# Patient Record
Sex: MALE | Race: WHITE | Employment: OTHER | URBAN - METROPOLITAN AREA
[De-identification: names, ages, dates, MRNs, and addresses within clinical notes are randomized per-mention and may not be internally consistent; named-entity substitution may affect disease eponyms.]

---

## 2020-09-02 NOTE — PROGRESS NOTES
HISTORY OF PRESENT ILLNESS  Demetra Mane is a 76 y.o. male. HPI   Pt here to establish care  Former PCP-Dr BRENDA Adan in [facility]   retired , lives alone  Hx severe MR s/p MV repair in 2014and had vein graft to RCA and left atrial appendage exclusion-Dr Claude Alexander  Had V nicholas on NST and hx Aflutter CV  Had sustained Vtach afterwards  ICD implant with ef 35%  Low normal EF in 2018 by Violeta Coulter now at [address]    Hx gerd, genuital HSV, mild HLD    Active physically , only mild HENDRICKSON, no CP  Walks for exercise    Stable weight per pt  Sees Dr Beto Chirinos for BPH and elevated PSA      Sees DERM MD for hx skin cancer q 6 months--non-melanoma    Had screening colonoscopy 2013-mild sig tics and hemorrhoids-Dr Makayla Diallo    Has had right sciatica x 4-5 months--improving with PT. Had Xray -DDD    Has been labs q 6 months    Has had screening colonoscopy 2013    Patient Active Problem List    Diagnosis Date Noted    Pure hypercholesterolemia 09/03/2020    Benign prostatic hyperplasia without lower urinary tract symptoms 09/03/2020     Current Outpatient Medications   Medication Sig Dispense Refill    atorvastatin (LIPITOR) 20 mg tablet TK 1 T PO ONCE D      digoxin (LANOXIN) 0.125 mg tablet TK 1 T PO QD      carvediloL (COREG) 6.25 mg tablet TK 1 T PO BID WF      amiodarone (CORDARONE) 200 mg tablet TK 1 T PO ONCE D      finasteride (PROSCAR) 5 mg tablet TK 1 T PO QD      lisinopriL (PRINIVIL, ZESTRIL) 5 mg tablet TK 1 T PO QD      triamcinolone acetonide (KENALOG) 0.1 % topical cream APPLY AA BID      amoxicillin (AMOXIL) 500 mg capsule Take 500 mg by mouth daily as needed. Takes prior to dental procedures      aspirin 81 mg tablet Take 81 mg by mouth daily.  GLUCOSAMINE HCL/CHONDR PERRY A NA (GLUCOSAMINE-CHONDROITIN) 750-600 mg Tab Take  by mouth daily.  multivitamin (ONE A DAY) tablet Take 1 Tab by mouth daily.       nicotinic acid (NIACIN) 500 mg tablet Take 500 mg by mouth daily.  grape seed extract 100 mg cap Take  by mouth daily.  OTHER Prostrate care 3 pill q day      OTHER Cholesterol control from Dr Lin Lazo catalog       Allergies   Allergen Reactions    Bacitracin Rash    Penicillin G Swelling     Past Medical History:   Diagnosis Date    Asthma     GERD (gastroesophageal reflux disease)     Kidney stones     Mitral valve prolapse      Past Surgical History:   Procedure Laterality Date    ABDOMEN SURGERY PROC UNLISTED  1982 1984    hernia repair    HX GI      appencectomy  with kinked intestine    HX MITRAL VALVE REPLACEMENT  2014    s/p Mitral valve repair    HX ORTHOPAEDIC  2001    achilies tendon rupture    HX UROLOGICAL      kidney stone removed    OR COLONOSCOPY FLX DX W/COLLJ SPEC WHEN PFRMD  6/11/2013          Family History   Problem Relation Age of Onset    Hypertension Father 46     Social History     Tobacco Use    Smoking status: Never Smoker    Smokeless tobacco: Never Used   Substance Use Topics    Alcohol use: Yes     Frequency: Monthly or less     Comment: occassional-moderate      No results found for: WBC, WBCT, WBCPOC, HGB, HGBPOC, HCT, HCTPOC, PLT, PLTPOC, MCV, MCVPOC, HGBEXT, HCTEXT, PLTEXT  No results found for: HBA1C, SJY5POPQ, HGBE8, GLU, GESTF, GLUCPOC, MCACR, MCA1, MCA2, MCA3, MCAU, LDL, LDLC, DLDLP, SCOT, CREAPOC, ACREA, CREA, REFC3, REFC4, XXO3ZULE   No results found for: CHOL, CHOLPOCT, HDL, LDL, LDLC, LDLCPOC, LDLCEXT, TRIGL, TGLPOCT, CHHD, CHHDX  No results found for: GFRNA, GFRNAPOC, GFRAA, GFRAAPOC, CREA, CREAPOC, BUN, IBUN, BUNPOC, NA, NAPOC, K, KPOCT, CL, CLPOC, CO2, CO2POC, MG, PHOS, ALBEU, PTH, PTHILT, EPO     Review of Systems   Constitutional: Negative for chills, fever, malaise/fatigue and weight loss. Eyes: Negative for blurred vision and double vision. Respiratory: Negative for cough and shortness of breath. Cardiovascular: Negative for chest pain and palpitations. Gastrointestinal: Negative for abdominal pain, blood in stool, constipation, diarrhea, melena, nausea and vomiting. Genitourinary: Negative for dysuria, frequency, hematuria and urgency. Musculoskeletal: Negative for back pain, falls, joint pain and myalgias. Neurological: Negative for dizziness, tremors and headaches. Physical Exam  Vitals signs and nursing note reviewed. Constitutional:       General: He is not in acute distress. Appearance: Normal appearance. He is well-developed. Comments: Appears stated age   HENT:      Head: Normocephalic. Cardiovascular:      Rate and Rhythm: Normal rate and regular rhythm. Heart sounds: Normal heart sounds. Pulmonary:      Effort: Pulmonary effort is normal.      Breath sounds: Normal breath sounds. Chest:       Abdominal:      Palpations: Abdomen is soft. Musculoskeletal:      Comments: RLE ankle brace   Neurological:      Mental Status: He is alert. ASSESSMENT and PLAN  Diagnoses and all orders for this visit:    1. Coronary artery disease involving native coronary artery of native heart without angina pectoris  -     CBC W/O DIFF  -     METABOLIC PANEL, COMPREHENSIVE  -     LIPID PANEL   No cp or angina  2. Benign prostatic hyperplasia without lower urinary tract symptoms   F/u ROBBIN DILLARD  3. Pure hypercholesterolemia  -     METABOLIC PANEL, COMPREHENSIVE  -     LIPID PANEL  -     TSH 3RD GENERATION   On statin-goal LDL <100  4. CHF   F/u Dr Tommy Nunez   Asymptomatic    5. Hx Vtach   S/p ICD   On amiodarone-f/u tsh lft    6. Preventive   Recommended flu shot   Will review health maintenance in records  Follow-up and Dispositions    · Return in about 6 months (around 3/3/2021) for medicare wellness.

## 2020-09-03 ENCOUNTER — OFFICE VISIT (OUTPATIENT)
Dept: INTERNAL MEDICINE CLINIC | Age: 75
End: 2020-09-03
Payer: MEDICARE

## 2020-09-03 VITALS
SYSTOLIC BLOOD PRESSURE: 137 MMHG | OXYGEN SATURATION: 98 % | HEIGHT: 68 IN | BODY MASS INDEX: 21.28 KG/M2 | DIASTOLIC BLOOD PRESSURE: 75 MMHG | RESPIRATION RATE: 14 BRPM | TEMPERATURE: 98.5 F | WEIGHT: 140.4 LBS | HEART RATE: 86 BPM

## 2020-09-03 DIAGNOSIS — E78.00 PURE HYPERCHOLESTEROLEMIA: ICD-10-CM

## 2020-09-03 DIAGNOSIS — N40.0 BENIGN PROSTATIC HYPERPLASIA WITHOUT LOWER URINARY TRACT SYMPTOMS: ICD-10-CM

## 2020-09-03 DIAGNOSIS — I25.10 CORONARY ARTERY DISEASE INVOLVING NATIVE CORONARY ARTERY OF NATIVE HEART WITHOUT ANGINA PECTORIS: Primary | ICD-10-CM

## 2020-09-03 PROCEDURE — G0463 HOSPITAL OUTPT CLINIC VISIT: HCPCS | Performed by: INTERNAL MEDICINE

## 2020-09-03 PROCEDURE — 99203 OFFICE O/P NEW LOW 30 MIN: CPT | Performed by: INTERNAL MEDICINE

## 2020-09-03 RX ORDER — FINASTERIDE 5 MG/1
TABLET, FILM COATED ORAL
COMMUNITY
Start: 2020-06-18

## 2020-09-03 RX ORDER — AMOXICILLIN 500 MG/1
500 CAPSULE ORAL
COMMUNITY

## 2020-09-03 RX ORDER — AMIODARONE HYDROCHLORIDE 200 MG/1
TABLET ORAL
COMMUNITY
Start: 2020-08-18

## 2020-09-03 RX ORDER — LISINOPRIL 5 MG/1
TABLET ORAL
COMMUNITY
Start: 2020-07-28 | End: 2021-10-06

## 2020-09-03 RX ORDER — TRIAMCINOLONE ACETONIDE 1 MG/G
CREAM TOPICAL
COMMUNITY
Start: 2020-06-15 | End: 2021-03-25

## 2020-09-03 RX ORDER — CARVEDILOL 6.25 MG/1
TABLET ORAL
COMMUNITY
Start: 2020-06-22

## 2020-09-03 RX ORDER — ATORVASTATIN CALCIUM 20 MG/1
TABLET, FILM COATED ORAL
COMMUNITY
Start: 2020-06-18 | End: 2021-03-25 | Stop reason: SDUPTHER

## 2020-09-03 RX ORDER — DIGOXIN 125 MCG
TABLET ORAL
COMMUNITY
Start: 2020-07-28 | End: 2021-10-06

## 2020-09-03 NOTE — PROGRESS NOTES
Reviewed record in preparation for visit and have obtained necessary documentation. Identified pt with two pt identifiers(name and ). Chief Complaint   Patient presents with   [facility] Maintenance Due   Topic Date Due    Hepatitis C Screening  1945    DTaP/Tdap/Td series (1 - Tdap) 1966    Lipid Screen  1985    Shingrix Vaccine Age 50> (1 of 2) 1995    GLAUCOMA SCREENING Q2Y  2010    Pneumococcal 65+ years (1 of 1 - PPSV23) 2010    Medicare Yearly Exam  2020    Flu Vaccine (1) 2020       Mr. Pablo Jama has a reminder for a \"due or due soon\" health maintenance. I have asked that he discuss health maintenance topic(s) due with His  primary care provider. Coordination of Care Questionnaire:  :     1) Have you been to an emergency room, urgent care clinic since your last visit? no   Hospitalized since your last visit? no             2) Have you seen or consulted any other health care providers outside of [address] [place] [address] since your last visit? no  (Include any pap smears or colon screenings in this section.)    3) Do you have an Advance Directive on file? No, Patient has copy at home and to bring in next appointment. 4) Are you interested in receiving information on Advance Directives? NO    Patient is accompanied by self I have received verbal consent from [address] to discuss any/all medical information while they are present in the room.

## 2020-09-04 LAB
ALBUMIN SERPL-MCNC: 3.9 G/DL (ref 3.7–4.7)
ALBUMIN/GLOB SERPL: 1.6 {RATIO} (ref 1.2–2.2)
ALP SERPL-CCNC: 46 IU/L (ref 39–117)
ALT SERPL-CCNC: 25 IU/L (ref 0–44)
AST SERPL-CCNC: 27 IU/L (ref 0–40)
BILIRUB SERPL-MCNC: 0.9 MG/DL (ref 0–1.2)
BUN SERPL-MCNC: 14 MG/DL (ref 8–27)
BUN/CREAT SERPL: 11 (ref 10–24)
CALCIUM SERPL-MCNC: 8.9 MG/DL (ref 8.6–10.2)
CHLORIDE SERPL-SCNC: 101 MMOL/L (ref 96–106)
CHOLEST SERPL-MCNC: 157 MG/DL (ref 100–199)
CO2 SERPL-SCNC: 26 MMOL/L (ref 20–29)
CREAT SERPL-MCNC: 1.24 MG/DL (ref 0.76–1.27)
ERYTHROCYTE [DISTWIDTH] IN BLOOD BY AUTOMATED COUNT: 12.6 % (ref 11.6–15.4)
GLOBULIN SER CALC-MCNC: 2.5 G/DL (ref 1.5–4.5)
GLUCOSE SERPL-MCNC: 87 MG/DL (ref 65–99)
HCT VFR BLD AUTO: 42.9 % (ref 37.5–51)
HDLC SERPL-MCNC: 66 MG/DL
HGB BLD-MCNC: 14.3 G/DL (ref 13–17.7)
LDLC SERPL CALC-MCNC: 78 MG/DL (ref 0–99)
MCH RBC QN AUTO: 30.7 PG (ref 26.6–33)
MCHC RBC AUTO-ENTMCNC: 33.3 G/DL (ref 31.5–35.7)
MCV RBC AUTO: 92 FL (ref 79–97)
PLATELET # BLD AUTO: 184 X10E3/UL (ref 150–450)
POTASSIUM SERPL-SCNC: 4.6 MMOL/L (ref 3.5–5.2)
PROT SERPL-MCNC: 6.4 G/DL (ref 6–8.5)
RBC # BLD AUTO: 4.66 X10E6/UL (ref 4.14–5.8)
SODIUM SERPL-SCNC: 138 MMOL/L (ref 134–144)
TRIGL SERPL-MCNC: 69 MG/DL (ref 0–149)
TSH SERPL DL<=0.005 MIU/L-ACNC: 1.82 UIU/ML (ref 0.45–4.5)
VLDLC SERPL CALC-MCNC: 13 MG/DL (ref 5–40)
WBC # BLD AUTO: 4.4 X10E3/UL (ref 3.4–10.8)

## 2020-09-09 ENCOUNTER — TELEPHONE (OUTPATIENT)
Dept: INTERNAL MEDICINE CLINIC | Age: 75
End: 2020-09-09

## 2020-09-09 NOTE — TELEPHONE ENCOUNTER
#[phone]   Pt returning your call from 9-4-20 for lab results.     Pt states you may call him on 9-10-20

## 2020-09-10 NOTE — TELEPHONE ENCOUNTER
Called, spoke to pt. Two identifiers confirmed. Notified pt of lab results/recommendations per Dr. Tianna Kim. Pt verbalized understanding of information discussed w/ no further questions at this time.

## 2021-02-18 NOTE — PERIOP NOTES
Patient denied any COVID-19 symptoms or possible exposure that would require a pre-procedural COVID-19 test for the Cath Lab procedure scheduled on 2/23/21.

## 2021-02-23 ENCOUNTER — HOSPITAL ENCOUNTER (OUTPATIENT)
Age: 76
Discharge: HOME | End: 2021-02-24
Attending: INTERNAL MEDICINE | Admitting: INTERNAL MEDICINE
Payer: MEDICARE

## 2021-02-23 DIAGNOSIS — R07.9 CHEST PAIN, UNSPECIFIED TYPE: ICD-10-CM

## 2021-02-23 LAB
ACT BLD: 224 SECS (ref 79–138)
ACT BLD: 279 SECS (ref 79–138)
ATRIAL RATE: 61 BPM
CALCULATED P AXIS, ECG09: 83 DEGREES
CALCULATED R AXIS, ECG10: 46 DEGREES
CALCULATED T AXIS, ECG11: -51 DEGREES
DIAGNOSIS, 93000: NORMAL
P-R INTERVAL, ECG05: 278 MS
Q-T INTERVAL, ECG07: 452 MS
QRS DURATION, ECG06: 96 MS
QTC CALCULATION (BEZET), ECG08: 455 MS
VENTRICULAR RATE, ECG03: 61 BPM

## 2021-02-23 PROCEDURE — C1887 CATHETER, GUIDING: HCPCS | Performed by: INTERNAL MEDICINE

## 2021-02-23 PROCEDURE — 74011250636 HC RX REV CODE- 250/636: Performed by: INTERNAL MEDICINE

## 2021-02-23 PROCEDURE — 77030008543 HC TBNG MON PRSS MRTM -A: Performed by: INTERNAL MEDICINE

## 2021-02-23 PROCEDURE — 77030028837 HC SYR ANGI PWR INJ COEU -A: Performed by: INTERNAL MEDICINE

## 2021-02-23 PROCEDURE — 85347 COAGULATION TIME ACTIVATED: CPT

## 2021-02-23 PROCEDURE — 77030010221 HC SPLNT WR POS TELE -B: Performed by: INTERNAL MEDICINE

## 2021-02-23 PROCEDURE — C1725 CATH, TRANSLUMIN NON-LASER: HCPCS | Performed by: INTERNAL MEDICINE

## 2021-02-23 PROCEDURE — 77030015766: Performed by: INTERNAL MEDICINE

## 2021-02-23 PROCEDURE — 74011250637 HC RX REV CODE- 250/637

## 2021-02-23 PROCEDURE — C1894 INTRO/SHEATH, NON-LASER: HCPCS | Performed by: INTERNAL MEDICINE

## 2021-02-23 PROCEDURE — 2709999900 HC NON-CHARGEABLE SUPPLY

## 2021-02-23 PROCEDURE — 77030019569 HC BND COMPR RAD TERU -B: Performed by: INTERNAL MEDICINE

## 2021-02-23 PROCEDURE — 92928 PRQ TCAT PLMT NTRAC ST 1 LES: CPT | Performed by: INTERNAL MEDICINE

## 2021-02-23 PROCEDURE — 93005 ELECTROCARDIOGRAM TRACING: CPT

## 2021-02-23 PROCEDURE — C1874 STENT, COATED/COV W/DEL SYS: HCPCS | Performed by: INTERNAL MEDICINE

## 2021-02-23 PROCEDURE — 74011000636 HC RX REV CODE- 636: Performed by: INTERNAL MEDICINE

## 2021-02-23 PROCEDURE — 74011250637 HC RX REV CODE- 250/637: Performed by: INTERNAL MEDICINE

## 2021-02-23 PROCEDURE — 99153 MOD SED SAME PHYS/QHP EA: CPT | Performed by: INTERNAL MEDICINE

## 2021-02-23 PROCEDURE — 77030019698 HC SYR ANGI MDLON MRTM -A: Performed by: INTERNAL MEDICINE

## 2021-02-23 PROCEDURE — 74011000250 HC RX REV CODE- 250: Performed by: INTERNAL MEDICINE

## 2021-02-23 PROCEDURE — 99152 MOD SED SAME PHYS/QHP 5/>YRS: CPT | Performed by: INTERNAL MEDICINE

## 2021-02-23 PROCEDURE — 93571 IV DOP VEL&/PRESS C FLO 1ST: CPT | Performed by: INTERNAL MEDICINE

## 2021-02-23 PROCEDURE — 77030019697 HC SYR ANGI INFL MRTM -B: Performed by: INTERNAL MEDICINE

## 2021-02-23 PROCEDURE — 93458 L HRT ARTERY/VENTRICLE ANGIO: CPT | Performed by: INTERNAL MEDICINE

## 2021-02-23 PROCEDURE — C1769 GUIDE WIRE: HCPCS | Performed by: INTERNAL MEDICINE

## 2021-02-23 DEVICE — STENT RONYX27518UX RESOLUTE ONYX 2.75X18
Type: IMPLANTABLE DEVICE | Status: FUNCTIONAL
Brand: RESOLUTE ONYX™

## 2021-02-23 RX ORDER — NIACIN 500 MG/1
500 TABLET, EXTENDED RELEASE ORAL DAILY
Status: DISCONTINUED | OUTPATIENT
Start: 2021-02-24 | End: 2021-02-24 | Stop reason: HOSPADM

## 2021-02-23 RX ORDER — SODIUM CHLORIDE 0.9 % (FLUSH) 0.9 %
5-40 SYRINGE (ML) INJECTION AS NEEDED
Status: DISCONTINUED | OUTPATIENT
Start: 2021-02-23 | End: 2021-02-24 | Stop reason: HOSPADM

## 2021-02-23 RX ORDER — VERAPAMIL HYDROCHLORIDE 2.5 MG/ML
INJECTION, SOLUTION INTRAVENOUS AS NEEDED
Status: DISCONTINUED | OUTPATIENT
Start: 2021-02-23 | End: 2021-02-23 | Stop reason: HOSPADM

## 2021-02-23 RX ORDER — AMIODARONE HYDROCHLORIDE 200 MG/1
200 TABLET ORAL DAILY
Status: DISCONTINUED | OUTPATIENT
Start: 2021-02-24 | End: 2021-02-24 | Stop reason: HOSPADM

## 2021-02-23 RX ORDER — ACETAMINOPHEN 650 MG/1
650 SUPPOSITORY RECTAL
Status: DISCONTINUED | OUTPATIENT
Start: 2021-02-23 | End: 2021-02-24 | Stop reason: HOSPADM

## 2021-02-23 RX ORDER — CLOPIDOGREL BISULFATE 75 MG/1
TABLET ORAL AS NEEDED
Status: DISCONTINUED | OUTPATIENT
Start: 2021-02-23 | End: 2021-02-23 | Stop reason: HOSPADM

## 2021-02-23 RX ORDER — ATORVASTATIN CALCIUM 20 MG/1
20 TABLET, FILM COATED ORAL
Status: DISCONTINUED | OUTPATIENT
Start: 2021-02-23 | End: 2021-02-24 | Stop reason: HOSPADM

## 2021-02-23 RX ORDER — SODIUM CHLORIDE 0.9 % (FLUSH) 0.9 %
5-40 SYRINGE (ML) INJECTION EVERY 8 HOURS
Status: DISCONTINUED | OUTPATIENT
Start: 2021-02-23 | End: 2021-02-24 | Stop reason: HOSPADM

## 2021-02-23 RX ORDER — GUAIFENESIN 100 MG/5ML
81 LIQUID (ML) ORAL DAILY
Status: DISCONTINUED | OUTPATIENT
Start: 2021-02-24 | End: 2021-02-24 | Stop reason: HOSPADM

## 2021-02-23 RX ORDER — DIGOXIN 125 MCG
0.12 TABLET ORAL DAILY
Status: DISCONTINUED | OUTPATIENT
Start: 2021-02-24 | End: 2021-02-24 | Stop reason: HOSPADM

## 2021-02-23 RX ORDER — GUAIFENESIN 100 MG/5ML
81 LIQUID (ML) ORAL
Status: ACTIVE | OUTPATIENT
Start: 2021-02-23 | End: 2021-02-23

## 2021-02-23 RX ORDER — THERA TABS 400 MCG
1 TAB ORAL DAILY
Status: DISCONTINUED | OUTPATIENT
Start: 2021-02-24 | End: 2021-02-24 | Stop reason: HOSPADM

## 2021-02-23 RX ORDER — HEPARIN SODIUM 200 [USP'U]/100ML
INJECTION, SOLUTION INTRAVENOUS
Status: COMPLETED | OUTPATIENT
Start: 2021-02-23 | End: 2021-02-23

## 2021-02-23 RX ORDER — FENTANYL CITRATE 50 UG/ML
INJECTION, SOLUTION INTRAMUSCULAR; INTRAVENOUS AS NEEDED
Status: DISCONTINUED | OUTPATIENT
Start: 2021-02-23 | End: 2021-02-23 | Stop reason: HOSPADM

## 2021-02-23 RX ORDER — LIDOCAINE HYDROCHLORIDE 10 MG/ML
INJECTION, SOLUTION EPIDURAL; INFILTRATION; INTRACAUDAL; PERINEURAL AS NEEDED
Status: DISCONTINUED | OUTPATIENT
Start: 2021-02-23 | End: 2021-02-23 | Stop reason: HOSPADM

## 2021-02-23 RX ORDER — ACETAMINOPHEN 325 MG/1
650 TABLET ORAL
Status: DISCONTINUED | OUTPATIENT
Start: 2021-02-23 | End: 2021-02-24 | Stop reason: HOSPADM

## 2021-02-23 RX ORDER — LISINOPRIL 5 MG/1
5 TABLET ORAL DAILY
Status: DISCONTINUED | OUTPATIENT
Start: 2021-02-24 | End: 2021-02-24 | Stop reason: HOSPADM

## 2021-02-23 RX ORDER — GUAIFENESIN 100 MG/5ML
LIQUID (ML) ORAL
Status: COMPLETED
Start: 2021-02-23 | End: 2021-02-23

## 2021-02-23 RX ORDER — PROMETHAZINE HYDROCHLORIDE 25 MG/1
12.5 TABLET ORAL
Status: DISCONTINUED | OUTPATIENT
Start: 2021-02-23 | End: 2021-02-24 | Stop reason: HOSPADM

## 2021-02-23 RX ORDER — CLOPIDOGREL BISULFATE 75 MG/1
75 TABLET ORAL DAILY
Status: DISCONTINUED | OUTPATIENT
Start: 2021-02-24 | End: 2021-02-24 | Stop reason: HOSPADM

## 2021-02-23 RX ORDER — HEPARIN SODIUM 1000 [USP'U]/ML
INJECTION, SOLUTION INTRAVENOUS; SUBCUTANEOUS AS NEEDED
Status: DISCONTINUED | OUTPATIENT
Start: 2021-02-23 | End: 2021-02-23 | Stop reason: HOSPADM

## 2021-02-23 RX ORDER — POLYETHYLENE GLYCOL 3350 17 G/17G
17 POWDER, FOR SOLUTION ORAL DAILY PRN
Status: DISCONTINUED | OUTPATIENT
Start: 2021-02-23 | End: 2021-02-24 | Stop reason: HOSPADM

## 2021-02-23 RX ORDER — MIDAZOLAM HYDROCHLORIDE 1 MG/ML
INJECTION, SOLUTION INTRAMUSCULAR; INTRAVENOUS AS NEEDED
Status: DISCONTINUED | OUTPATIENT
Start: 2021-02-23 | End: 2021-02-23 | Stop reason: HOSPADM

## 2021-02-23 RX ORDER — CARVEDILOL 6.25 MG/1
6.25 TABLET ORAL 2 TIMES DAILY WITH MEALS
Status: DISCONTINUED | OUTPATIENT
Start: 2021-02-23 | End: 2021-02-24 | Stop reason: HOSPADM

## 2021-02-23 RX ORDER — FINASTERIDE 5 MG/1
5 TABLET, FILM COATED ORAL DAILY
Status: DISCONTINUED | OUTPATIENT
Start: 2021-02-24 | End: 2021-02-24 | Stop reason: HOSPADM

## 2021-02-23 RX ORDER — ONDANSETRON 2 MG/ML
4 INJECTION INTRAMUSCULAR; INTRAVENOUS
Status: DISCONTINUED | OUTPATIENT
Start: 2021-02-23 | End: 2021-02-24 | Stop reason: HOSPADM

## 2021-02-23 RX ADMIN — ATORVASTATIN CALCIUM 20 MG: 20 TABLET, FILM COATED ORAL at 21:45

## 2021-02-23 RX ADMIN — Medication 10 ML: at 22:00

## 2021-02-23 RX ADMIN — Medication 10 ML: at 17:33

## 2021-02-23 RX ADMIN — ASPIRIN 81 MG: 81 TABLET, CHEWABLE ORAL at 11:25

## 2021-02-23 NOTE — Clinical Note
TRANSFER - OUT REPORT:     Verbal report given to: St. vida RN. Report consisted of patient's Situation, Background, Assessment and   Recommendations(SBAR). Opportunity for questions and clarification was provided. Patient transported to: [facility].

## 2021-02-23 NOTE — Clinical Note
Lesion: Located in the Proximal LAD. Stent deployed. Single technique used. First inflation pressure = 14 janae; inflation time: 20 sec.

## 2021-02-23 NOTE — PROGRESS NOTES
Brief Procedure Note    Patient: Temi Lou MRN: [id number]  SSN: [id number]    YOB: 1945  Age: 68 y.o. Sex: male      Date of Procedure: 2/23/2021     Pre-procedure Diagnosis: Abn nuc    Post-procedure Diagnosis: 1v CAD, patent SVG-RCA, abn IFR of LAD, PTCA/PCI of the LAD w/ a ELOY    Procedure: LHC, cors, alexandria bypass angio, IFR/PCI of LAD    Performed By: Cirilo Lema III, DO     Anesthesia: Moderate Sedation    Estimated Blood Loss: Less than 10 mL      Specimens:  None    Findings: as above    Complications: None    Implants: 1 [place] ELOY    Recommendations: Continue medical therapy.     Signed By: Mai Sanders DO     February 23, 2021

## 2021-02-23 NOTE — Clinical Note
Lesion located in the Proximal LAD. Balloon inflated using single inflation technique. Lesion #1: Pressure = 14 janae; Duration = 15 sec.

## 2021-02-24 VITALS
SYSTOLIC BLOOD PRESSURE: 109 MMHG | TEMPERATURE: 97.6 F | BODY MASS INDEX: 20.4 KG/M2 | DIASTOLIC BLOOD PRESSURE: 63 MMHG | RESPIRATION RATE: 18 BRPM | OXYGEN SATURATION: 97 % | HEIGHT: 68 IN | HEART RATE: 60 BPM | WEIGHT: 134.6 LBS

## 2021-02-24 LAB
ALBUMIN SERPL-MCNC: 3 G/DL (ref 3.5–5)
ALBUMIN/GLOB SERPL: 1 {RATIO} (ref 1.1–2.2)
ALP SERPL-CCNC: 48 U/L (ref 45–117)
ALT SERPL-CCNC: 33 U/L (ref 12–78)
ANION GAP SERPL CALC-SCNC: 5 MMOL/L (ref 5–15)
AST SERPL-CCNC: 25 U/L (ref 15–37)
BASOPHILS # BLD: 0 K/UL (ref 0–0.1)
BASOPHILS NFR BLD: 1 % (ref 0–1)
BILIRUB SERPL-MCNC: 0.8 MG/DL (ref 0.2–1)
BUN SERPL-MCNC: 16 MG/DL (ref 6–20)
BUN/CREAT SERPL: 16 (ref 12–20)
CALCIUM SERPL-MCNC: 8.2 MG/DL (ref 8.5–10.1)
CHLORIDE SERPL-SCNC: 107 MMOL/L (ref 97–108)
CO2 SERPL-SCNC: 28 MMOL/L (ref 21–32)
CREAT SERPL-MCNC: 1.03 MG/DL (ref 0.7–1.3)
DIFFERENTIAL METHOD BLD: ABNORMAL
EOSINOPHIL # BLD: 0.2 K/UL (ref 0–0.4)
EOSINOPHIL NFR BLD: 4 % (ref 0–7)
ERYTHROCYTE [DISTWIDTH] IN BLOOD BY AUTOMATED COUNT: 12.9 % (ref 11.5–14.5)
GLOBULIN SER CALC-MCNC: 2.9 G/DL (ref 2–4)
GLUCOSE SERPL-MCNC: 69 MG/DL (ref 65–100)
HCT VFR BLD AUTO: 38.7 % (ref 36.6–50.3)
HGB BLD-MCNC: 12.8 G/DL (ref 12.1–17)
IMM GRANULOCYTES # BLD AUTO: 0 K/UL (ref 0–0.04)
IMM GRANULOCYTES NFR BLD AUTO: 0 % (ref 0–0.5)
LYMPHOCYTES # BLD: 0.8 K/UL (ref 0.8–3.5)
LYMPHOCYTES NFR BLD: 20 % (ref 12–49)
MCH RBC QN AUTO: 30.5 PG (ref 26–34)
MCHC RBC AUTO-ENTMCNC: 33.1 G/DL (ref 30–36.5)
MCV RBC AUTO: 92.4 FL (ref 80–99)
MONOCYTES # BLD: 0.6 K/UL (ref 0–1)
MONOCYTES NFR BLD: 16 % (ref 5–13)
NEUTS SEG # BLD: 2.3 K/UL (ref 1.8–8)
NEUTS SEG NFR BLD: 59 % (ref 32–75)
NRBC # BLD: 0 K/UL (ref 0–0.01)
NRBC BLD-RTO: 0 PER 100 WBC
PLATELET # BLD AUTO: 146 K/UL (ref 150–400)
PMV BLD AUTO: 11.4 FL (ref 8.9–12.9)
POTASSIUM SERPL-SCNC: 3.9 MMOL/L (ref 3.5–5.1)
PROT SERPL-MCNC: 5.9 G/DL (ref 6.4–8.2)
RBC # BLD AUTO: 4.19 M/UL (ref 4.1–5.7)
RBC MORPH BLD: ABNORMAL
SODIUM SERPL-SCNC: 140 MMOL/L (ref 136–145)
WBC # BLD AUTO: 3.9 K/UL (ref 4.1–11.1)

## 2021-02-24 PROCEDURE — 36415 COLL VENOUS BLD VENIPUNCTURE: CPT

## 2021-02-24 PROCEDURE — 85025 COMPLETE CBC W/AUTO DIFF WBC: CPT

## 2021-02-24 PROCEDURE — 74011250637 HC RX REV CODE- 250/637: Performed by: INTERNAL MEDICINE

## 2021-02-24 PROCEDURE — 80053 COMPREHEN METABOLIC PANEL: CPT

## 2021-02-24 RX ORDER — CLOPIDOGREL BISULFATE 75 MG/1
75 TABLET ORAL DAILY
Qty: 90 TAB | Refills: 3 | Status: SHIPPED | OUTPATIENT
Start: 2021-02-24

## 2021-02-24 RX ADMIN — LISINOPRIL 5 MG: 5 TABLET ORAL at 10:06

## 2021-02-24 RX ADMIN — ASPIRIN 81 MG: 81 TABLET, CHEWABLE ORAL at 10:06

## 2021-02-24 RX ADMIN — CLOPIDOGREL BISULFATE 75 MG: 75 TABLET ORAL at 10:07

## 2021-02-24 RX ADMIN — DIGOXIN 0.12 MG: 125 TABLET ORAL at 10:06

## 2021-02-24 RX ADMIN — Medication 10 ML: at 06:41

## 2021-02-24 RX ADMIN — AMIODARONE HYDROCHLORIDE 200 MG: 200 TABLET ORAL at 10:06

## 2021-02-24 RX ADMIN — CARVEDILOL 6.25 MG: 6.25 TABLET, FILM COATED ORAL at 10:06

## 2021-02-24 NOTE — PROGRESS NOTES
Discussed discharge instructions with patient and niece. All questions answered. Cath site CDI. VSS. Tolerating ambulation in room and hallway. Denies chest pain, SOB, or dizziness. Escorted out via w/c, discharged home with niece in a private vehicle.

## 2021-02-24 NOTE — PROGRESS NOTES
Pt. Ambulated to bathroom and in the alves without difficulty. Pt. Denied SOB, CP and dizziness. Right radial cath site dressing intact, no bleeding and hematoma.

## 2021-02-24 NOTE — DISCHARGE INSTR - DIET
[address] [facility], [address]   [phone] [facility] [address] [facility]    [email or web address] Patient Discharge Instructions [place] Jasvir / [id number] : 1945 Admitted 2021 Discharged: 2021 It is important that you take the medication exactly as they are prescribed. Keep your medication in the bottles provided by the pharmacist and keep a list of the medication names, dosages, and times to be taken in your wallet. Do not take other medications without consulting your doctor. BRING ALL OF YOUR MEDICINES TO YOUR OFFICE VISIT with Kimberly Steve DO or my nurse practitioner, Fatoumata Perea. Debra Desouza Follow-up with Kimberly Steve DO or my nurse practitioner, Fatoumata Preea in 2 weeks. Cardiac Catheterization  Discharge Instructions Transradial Catheterization Discharge Instructions (WRIST) Discharge instructions: Your radial artery in your wrist was used for your cardiac catheterization. This site may be slightly bruised and sore following your procedure. Expect mild tingling or the hand and tenderness at the puncture site for up to 3 days. Excess movement of the wrist used should be avoided for the next 24-48 hours. No lifting over 2 pounds (approximately a ½ gallon of milk) with this arm for 24 hours. Keep the site of the procedure covered with a bandage for 24 hours. You may shower the day after your procedure. Do not take a tub bath or submerge the puncture site in water for 48 hours. No heavy impact activity/lifting > 30 pounds for 1 week. If bleeding of the wrist occurs at home: If the site on your wrist where you had the catheterization procedure begins to bleed, do not panic. Place 1 or 2 fingers over the puncture site and hold pressure to stop the bleeding. You may be able to feel your pulse as you hold pressure. Lift your fingers after 5 minutes to see if the bleeding has stopped. Once the bleeding has stopped, gently wipe the wrist area clean and cover with a bandage.  
 
If the bleeding from your wrist does not stop after 15 minutes, or if there is a large amount of bleeding or spurting, call 911 immediately (do not drive yourself to the hospital).  
 
Other concerns:  
The site may be slightly bruised and sore following your procedure. Should any of the following occur, contact your physician immediately:  
Any cool or coldness of the arm, discoloration over a large area, ongoing numbness or any abnormal sensations , moderate to severe pain or swelling in the arm.   
Redness, soreness, swelling, chills or fever, or colored drainage at the procedure site within 3-7 days after your procedure.  
 
If you have any further questions or concerns regarding your procedure please call the Cardiac Cath Lab office at [phone]. During regular business hours ask to speak to Dr. Simental. During non-business hours the answering service will answer. Ask to speak to the physician on call for [facility].  
 
Transfemoral Catheterization Discharge Instructions (GROIN) 
 
Do not drive, operate any machinery, or sign any legal documents for 24 hours after your procedure.  You must have someone to drive you home. 
 
You may take a shower 24 hours after your cardiac catheterization.  Be sure to get the dressing wet and then remove it; gently wash the area with warm soapy water.  Pat dry and leave open to air.  To help prevent infections, be sure to keep the cath site clean and dry.  No lotions, creams, powders, ointments, etc. in the cath site for approximately 1 week. 
 
Do not take a tub bath, get in a hot tub or swimming pool for approximately 5 days or until the cath site is completely healed.   
 
No strenuous activity or heavy lifting over 10 lbs. for 7 days. 
 
 Drink plenty of fluids for 24-48 hours after your cath to flush the contrast dye from your kidneys. No alcoholic beverages for 24 hours. You may resume your previous diet (low fat, low cholesterol) after your cath. After your cath, some bruising or discomfort is common during the healing process. Tylenol, 1-2 tablets every 6 hours as needed, is recommended if you experience any discomfort. If you experience any signs or symptoms of infection such as fever, chills, or poorly healing incision, persistent tenderness or swelling in the groin, redness and/or warmth to the touch, numbness, significant tingling or pain at the groin site or affected extremity, rash, drainage from the cath site, or if the leg feels tight or swollen, call your physician right away. If bleeding at the cath site occurs, take a clean gauze pad and apply direct pressure to the groin just above the puncture site. Call 911 immediately, and continue to apply direct pressure until an ambulance gets to your location. You may return to work  2  days after your cardiac cath if no groin bleeding. Information obtained by : 
I understand that if any problems occur once I am at home I am to contact my physician. I understand and acknowledge receipt of the instructions indicated above. R.N.'s Signature                                                                  Date/Time Patient or Representative Signature                                                          Date/Time Ana Das III, [address] [facility] [address] [facility], [address] [phone] [address]    [email or web address]

## 2021-02-24 NOTE — PROGRESS NOTES
End of Shift Note    Bedside shift change report given to Anil Contreras RN (oncoming nurse) by David Jones (offgoing nurse). Report included the following information SBAR, Kardex, Procedure Summary, Intake/Output, MAR, Accordion, Recent Results, Med Rec Status, Cardiac Rhythm Paced, Alarm Parameters , Pre Procedure Checklist, Procedure Verification, Quality Measures and Dual Neuro Assessment    Shift worked:  [id number]     Shift summary and any significant changes:    No change   Concerns for physician to address:    Zone phone for oncoming shift:  [phone]     Activity:  Activity Level: Bed Rest  Number times ambulated in hallways past shift: 0  Number of times OOB to chair past shift: 1    Cardiac:   Cardiac Monitoring: Yes      Cardiac Rhythm: Paced    Access:   Current line(s): PIV     Genitourinary:   Urinary status: voiding    Respiratory:   O2 Device: Room air  Chronic home O2 use?: NO  Incentive spirometer at bedside: NO     GI:     Current diet:  DIET CARDIAC Regular  Passing flatus: YES  Tolerating current diet: YES       Pain Management:   Patient states pain is manageable on current regimen: YES    Skin:  Vasyl Score: 23  Interventions: increase time out of bed    Patient Safety:  Fall Score:  Total Score: 1  Interventions: bed/chair alarm, gripper socks, pt to call before getting OOB and stay with me (per policy)       Length of Stay:  Expected LOS: - - -  Actual LOS: 0      Rachelle Marie

## 2021-02-24 NOTE — PROGRESS NOTES
Bedside shift change report given to Jyoti Diaz RN (oncoming nurse) by Nikki Aranda RN (offgoing nurse). Report included the following information SBAR, Kardex, Procedure Summary, Intake/Output, MAR, Accordion, Recent Results, Med Rec Status, Cardiac Rhythm Paced, Alarm Parameters , Pre Procedure Checklist, Procedure Verification, Quality Measures and Dual Neuro Assessment.

## 2021-02-24 NOTE — CARDIO/PULMONARY
Cardiac Rehab Note: chart review/referral  
 
Cardiac cath 2/23/21 with stent Smoking history assessed. Patient is a non smoker. Smoking Cessation Program link has not been added to the AVS. EF not available at this time. CAD education folder, with heart heathy diet, warning signs, heart facts, catheterization brochure, and out patient cardiac rehab program provided to [address] [facility] on 2/24/21. Educated using teach back method. Reviewed CAD diagnosis definition and purpose of intervention. Discussed risk factors for CAD to include the following: family history, elevated BMI, hyperlipidemia, hypertension, diabetes, and stress. Discussed Heart Healthy/Low Sodium (less than 2000 mg) diet. Reviewed the importance of medication compliance, follow up appointments with cardiologist, signs and symptoms of angina, and what to report to physician after discharge. Emphasized the value of cardiac rehab. Discussed Cardiac Rehab Program format, benefits, and encouraged enrollment to assist with risk modification and management. Patient lives over an hour away, declines outpatient Cardiac Rehab enrollment at this time due to distance. Paul Pinto verbalized understanding with questions answered.   Flonnie Buerger, RN

## 2021-02-26 ENCOUNTER — TELEPHONE (OUTPATIENT)
Dept: INTERNAL MEDICINE CLINIC | Age: 76
End: 2021-02-26

## 2021-02-26 NOTE — TELEPHONE ENCOUNTER
Patient states he needs a call back to discuss if he needs a sooner appt than 3/25/21 due to patient just having a Stint put in. Please call to discuss & advise.  Thank ou

## 2021-03-02 NOTE — TELEPHONE ENCOUNTER
MD Avani Horvath LPN   Caller: Unspecified (4 days ago,  1:38 PM)             Scheduled appt on 3/25 should be ok

## 2021-03-02 NOTE — TELEPHONE ENCOUNTER
Per PSR BJ, pt called back and was notified of Dr. Qi Cooper response. Per PSR BJ, pt will keep 3/25/21 appt. Closing.

## 2021-03-11 DIAGNOSIS — Z86.79 HX OF VENTRICULAR TACHYCARDIA: ICD-10-CM

## 2021-03-11 DIAGNOSIS — I50.9 CHRONIC CONGESTIVE HEART FAILURE, UNSPECIFIED HEART FAILURE TYPE (HCC): Primary | ICD-10-CM

## 2021-03-11 DIAGNOSIS — E78.00 PURE HYPERCHOLESTEROLEMIA: ICD-10-CM

## 2021-03-11 DIAGNOSIS — I25.10 CORONARY ARTERY DISEASE INVOLVING NATIVE CORONARY ARTERY OF NATIVE HEART WITHOUT ANGINA PECTORIS: ICD-10-CM

## 2021-03-11 DIAGNOSIS — I50.22 CHRONIC SYSTOLIC CONGESTIVE HEART FAILURE (HCC): Primary | ICD-10-CM

## 2021-03-11 PROBLEM — Z95.2 S/P MVR (MITRAL VALVE REPLACEMENT): Status: ACTIVE | Noted: 2021-03-11

## 2021-03-17 LAB
CHOLEST SERPL-MCNC: 165 MG/DL (ref 100–199)
DIGOXIN SERPL-MCNC: 0.9 NG/ML (ref 0.5–0.9)
HDLC SERPL-MCNC: 67 MG/DL
LDLC SERPL CALC-MCNC: 82 MG/DL (ref 0–99)
TRIGL SERPL-MCNC: 88 MG/DL (ref 0–149)
TSH SERPL DL<=0.005 MIU/L-ACNC: 3.49 UIU/ML (ref 0.45–4.5)
VLDLC SERPL CALC-MCNC: 16 MG/DL (ref 5–40)

## 2021-03-25 ENCOUNTER — OFFICE VISIT (OUTPATIENT)
Dept: INTERNAL MEDICINE CLINIC | Age: 76
End: 2021-03-25
Payer: MEDICARE

## 2021-03-25 VITALS
OXYGEN SATURATION: 98 % | WEIGHT: 142 LBS | HEART RATE: 74 BPM | SYSTOLIC BLOOD PRESSURE: 94 MMHG | HEIGHT: 68 IN | DIASTOLIC BLOOD PRESSURE: 61 MMHG | BODY MASS INDEX: 21.52 KG/M2 | TEMPERATURE: 98.4 F | RESPIRATION RATE: 16 BRPM

## 2021-03-25 DIAGNOSIS — I25.10 CORONARY ARTERY DISEASE INVOLVING NATIVE CORONARY ARTERY OF NATIVE HEART WITHOUT ANGINA PECTORIS: Primary | ICD-10-CM

## 2021-03-25 DIAGNOSIS — I50.22 CHRONIC SYSTOLIC CONGESTIVE HEART FAILURE (HCC): ICD-10-CM

## 2021-03-25 DIAGNOSIS — R09.81 SINUS CONGESTION: ICD-10-CM

## 2021-03-25 DIAGNOSIS — E78.00 PURE HYPERCHOLESTEROLEMIA: ICD-10-CM

## 2021-03-25 DIAGNOSIS — Z00.00 MEDICARE ANNUAL WELLNESS VISIT, SUBSEQUENT: ICD-10-CM

## 2021-03-25 PROCEDURE — G8510 SCR DEP NEG, NO PLAN REQD: HCPCS | Performed by: INTERNAL MEDICINE

## 2021-03-25 PROCEDURE — G8427 DOCREV CUR MEDS BY ELIG CLIN: HCPCS | Performed by: INTERNAL MEDICINE

## 2021-03-25 PROCEDURE — 1101F PT FALLS ASSESS-DOCD LE1/YR: CPT | Performed by: INTERNAL MEDICINE

## 2021-03-25 PROCEDURE — G0463 HOSPITAL OUTPT CLINIC VISIT: HCPCS | Performed by: INTERNAL MEDICINE

## 2021-03-25 PROCEDURE — G8420 CALC BMI NORM PARAMETERS: HCPCS | Performed by: INTERNAL MEDICINE

## 2021-03-25 PROCEDURE — 99214 OFFICE O/P EST MOD 30 MIN: CPT | Performed by: INTERNAL MEDICINE

## 2021-03-25 PROCEDURE — G0439 PPPS, SUBSEQ VISIT: HCPCS | Performed by: INTERNAL MEDICINE

## 2021-03-25 PROCEDURE — G8536 NO DOC ELDER MAL SCRN: HCPCS | Performed by: INTERNAL MEDICINE

## 2021-03-25 RX ORDER — ATORVASTATIN CALCIUM 40 MG/1
TABLET, FILM COATED ORAL
Qty: 90 TAB | Refills: 3 | Status: SHIPPED | OUTPATIENT
Start: 2021-03-25 | End: 2022-03-29

## 2021-03-25 RX ORDER — LEVOTHYROXINE SODIUM 50 UG/1
TABLET ORAL
COMMUNITY
End: 2022-05-03 | Stop reason: DRUGHIGH

## 2021-03-25 NOTE — PROGRESS NOTES
HISTORY OF PRESENT ILLNESS  Lanie Robertson is a 68 y.o. male. HPI   Fu CAD CHF hx V-tach HLD BPH   Had recent labs LDL 80  Brought AMD today     Had cardiac stent placed LAD /DESlast month for abnl stress test, left arm pain, sob and fatigue-plavix added to aspirin  Feels much better overall  Sees urologist yearly now--in [facility]  Walking upstairs without difficulty or SOB    Sees OLGA DILLARD for skin checks and needs bx right cheek lesion  Has had chronic left maxillary sinus congestion and recent bleeding from left nares    Last OV  Pt here to establish care  Former PCP-Dr BRENDA Adan in [address] [facility]-records [place] and reviewd   retired , lives alone  Hx severe MR s/p MV repair in 2014and had vein graft to RCA and left atrial appendage exclusion-Dr Rachael Madera  Had V tach on NST and hx Aflutter CV  Had sustained Vtach afterwards  ICD implant with ef 35%  Low normal EF in 2018 by CTA  Sees Dr Norma Razo now at [address]     Hx gerd, genital HSV, mild HLD     Active physically , only mild HENDRICKSON, no CP  Walks for exercise     Stable weight per pt  Sees Dr William Talley for BPH and elevated PSA  Sees DERM MD for hx skin cancer q 6 months--non-melanoma     Had screening colonoscopy 2013-mild sig tics and hemorrhoids-Dr Rashaad York     Has had right sciatica x 4-5 months--improving with PT.  Had Xray -DDD     Has been labs q 6 months     Has had screening colonoscopy 2013         Patient Active Problem List    Diagnosis Date Noted    S/P MVR (mitral valve replacement) 03/11/2021    Chronic systolic congestive heart failure ([address] 03/11/2021    Hx of ventricular tachycardia 03/11/2021    Coronary artery disease involving native coronary artery of native heart without angina pectoris 03/11/2021    Pure hypercholesterolemia 09/03/2020    Benign prostatic hyperplasia without lower urinary tract symptoms 09/03/2020     Current Outpatient Medications   Medication Sig Dispense Refill    clopidogreL (PLAVIX) 75 mg tab Take 1 Tab by mouth daily. 90 Tab 3    atorvastatin (LIPITOR) 20 mg tablet TK 1 T PO ONCE D      digoxin (LANOXIN) 0.125 mg tablet TK 1 T PO QD      carvediloL (COREG) 6.25 mg tablet TK 1 T PO BID WF      amiodarone (CORDARONE) 200 mg tablet TK 1 T PO ONCE D      finasteride (PROSCAR) 5 mg tablet TK 1 T PO QD      lisinopriL (PRINIVIL, ZESTRIL) 5 mg tablet TK 1 T PO QD      triamcinolone acetonide (KENALOG) 0.1 % topical cream APPLY AA BID      amoxicillin (AMOXIL) 500 mg capsule Take 500 mg by mouth daily as needed. Takes prior to dental procedures      aspirin 81 mg tablet Take 81 mg by mouth daily.  GLUCOSAMINE HCL/CHONDR PERRY A NA (GLUCOSAMINE-CHONDROITIN) 750-600 mg Tab Take  by mouth daily.  multivitamin (ONE A DAY) tablet Take 1 Tab by mouth daily.  nicotinic acid (NIACIN) 500 mg tablet Take 500 mg by mouth daily.  grape seed extract 100 mg cap Take  by mouth daily.       OTHER Prostrate care 3 pill q day      OTHER Cholesterol control from Dr Holley Sandhoff catalog       Allergies   Allergen Reactions    Bacitracin Rash    Penicillin G Swelling      Lab Results   Component Value Date/Time    WBC 3.9 (L) 02/24/2021 05:30 AM    HGB 12.8 02/24/2021 05:30 AM    HCT 38.7 02/24/2021 05:30 AM    PLATELET 513 (L) 76/94/0652 05:30 AM    MCV 92.4 02/24/2021 05:30 AM     Lab Results   Component Value Date/Time    Glucose 69 02/24/2021 05:30 AM    LDL, calculated 82 03/16/2021 09:31 AM    Creatinine 1.03 02/24/2021 05:30 AM      Lab Results   Component Value Date/Time    Cholesterol, total 165 03/16/2021 09:31 AM    HDL Cholesterol 67 03/16/2021 09:31 AM    LDL, calculated 82 03/16/2021 09:31 AM    Triglyceride 88 03/16/2021 09:31 AM     Lab Results   Component Value Date/Time    GFR est non-AA >60 02/24/2021 05:30 AM    GFR est AA >60 02/24/2021 05:30 AM    Creatinine 1.03 02/24/2021 05:30 AM    BUN 16 02/24/2021 05:30 AM    Sodium 140 02/24/2021 05:30 AM    Potassium 3.9 02/24/2021 05:30 AM Chloride 107 02/24/2021 05:30 AM    CO2 28 02/24/2021 05:30 AM     No results found for: Dana CARRERO, [id number], [id number]  Lab Results   Component Value Date/Time    TSH 3.490 03/16/2021 09:31 AM      Lab Results   Component Value Date/Time    Glucose 69 02/24/2021 05:30 AM         ROS    Physical Exam  Vitals signs and nursing note reviewed. Constitutional:       General: He is not in acute distress. Appearance: He is well-developed. Comments: Appears stated age   HENT:      Head: Normocephalic. Cardiovascular:      Rate and Rhythm: Normal rate and regular rhythm. Heart sounds: Normal heart sounds. Pulmonary:      Effort: Pulmonary effort is normal.      Breath sounds: Normal breath sounds. Abdominal:      Palpations: Abdomen is soft. Neurological:      Mental Status: He is alert. ASSESSMENT and PLAN         This is the Subsequent Medicare Annual Wellness Exam, performed 12 months or more after the Initial AWV or the last Subsequent AWV    I have reviewed the patient's medical history in detail and updated the computerized patient record. Depression Risk Factor Screening:     3 most recent PHQ Screens 3/25/2021   Little interest or pleasure in doing things Not at all   Feeling down, depressed, irritable, or hopeless Not at all   Total Score PHQ 2 0       Alcohol Risk Screen    Do you average more than 1 drink per night or more than 7 drinks a week: No    In the past three months have you have had more than 4 drinks containing alcohol on one occasion: No        Functional Ability and Level of Safety:    Hearing: The patient needs further evaluation. Activities of Daily Living: The home contains: [employer]  Patient does total self care      Ambulation: with no difficulty     Fall Risk:  Fall Risk Assessment, last 12 mths 3/25/2021   Able to walk? Yes   Fall in past 12 months? 0   Do you feel unsteady?  0   Are you worried about falling 0 Abuse Screen:  Patient is not abused       Cognitive Screening    Has your family/caregiver stated any concerns about your memory: no     Cognitive Screening: Normal - serial 3    Assessment/Plan   Education and counseling provided:  Are appropriate based on today's review and evaluation  Prostate cancer screening tests (PSA, covered annually)  covid and shingrix recommended    1. CAD   S/p recent LAD ELOY   On aspirin/plavix   Fu Dr Phu Queen    2. HLD   LDL above goal   Increase lipitor 40 mg every day   Labs next OV    3. CHF   Compensated    4.  Sinus congestion   Referral to ENT if pt desires appt    Health Maintenance Due     Health Maintenance Due   Topic Date Due    Hepatitis C Screening  Never done    COVID-19 Vaccine (1) Never done    Shingrix Vaccine Age 50> (1 of 2) Never done    Pneumococcal 65+ years (1 of 1 - PPSV23) Never done    Medicare Yearly Exam  Never done       Patient Care Team   Patient Care Team:  Preston Booth MD as PCP - General (Internal Medicine)  Preston Booth MD as PCP - [address] [place] [address] Dr Wei Provider    History     Patient Active Problem List   Diagnosis Code    Pure hypercholesterolemia E78.00    Benign prostatic hyperplasia without lower urinary tract symptoms N40.0    S/P MVR (mitral valve replacement) Z95.2    Chronic systolic congestive heart failure ([address] I50.22    Hx of ventricular tachycardia Z86.79    Coronary artery disease involving native coronary artery of native heart without angina pectoris I25.10     Past Medical History:   Diagnosis Date    Asthma     GERD (gastroesophageal reflux disease)     Kidney stones     Mitral valve prolapse       Past Surgical History:   Procedure Laterality Date    HX GI      appencectomy  with kinked intestine    HX MITRAL VALVE REPLACEMENT  2014    s/p Mitral valve repair    HX ORTHOPAEDIC  2001    achilies tendon rupture    HX UROLOGICAL      kidney stone removed    MO ABDOMEN SURGERY PROC UNLISTED  1982 1984    hernia repair  NH COLONOSCOPY FLX DX W/COLLJ SPEC WHEN PFRMD  6/11/2013          Current Outpatient Medications   Medication Sig Dispense Refill    levothyroxine (SYNTHROID) 50 mcg tablet Take  by mouth Daily (before breakfast).  atorvastatin (LIPITOR) 40 mg tablet TK 1 T PO ONCE D 90 Tab 3    clopidogreL (PLAVIX) 75 mg tab Take 1 Tab by mouth daily. 90 Tab 3    digoxin (LANOXIN) 0.125 mg tablet TK 1 T PO QD      carvediloL (COREG) 6.25 mg tablet TK 1 T PO BID WF      amiodarone (CORDARONE) 200 mg tablet TK 1 T PO ONCE D      finasteride (PROSCAR) 5 mg tablet TK 1 T PO QD      lisinopriL (PRINIVIL, ZESTRIL) 5 mg tablet TK 1 T PO QD      amoxicillin (AMOXIL) 500 mg capsule Take 500 mg by mouth daily as needed. Takes prior to dental procedures      aspirin 81 mg tablet Take 81 mg by mouth daily.        Allergies   Allergen Reactions    Bacitracin Rash    Penicillin G Swelling       Family History   Problem Relation Age of Onset    Hypertension Father 46     Social History     Tobacco Use    Smoking status: Never Smoker    Smokeless tobacco: Never Used   Substance Use Topics    Alcohol use: Yes     Frequency: Monthly or less     Drinks per session: 1 or 2     Binge frequency: Never     Comment: occassional-moderate

## 2021-03-25 NOTE — LETTER
3/25/2021 Mr. Temi Lou [address] [address] Dear Temi Lou: Please find your most recent results below. Resulted Orders DIGOXIN Result Value Ref Range Digoxin level 0.9 0.5 - 0.9 ng/mL Narrative Performed at:  [address]  [id number] : Consuelo Sage MD, Phone:  [phone] TSH 3RD GENERATION Result Value Ref Range TSH 3.490 0.450 - 4.500 uIU/mL Narrative Performed at:  [address]  [id number] : Consuelo Sage MD, Phone:  [phone] LIPID PANEL Result Value Ref Range Cholesterol, total 165 100 - 199 mg/dL Triglyceride 88 0 - 149 mg/dL HDL Cholesterol 67 >39 mg/dL VLDL, calculated 16 5 - 40 mg/dL LDL, calculated 82 0 - 99 mg/dL Narrative Performed at:  [address]  [id number] : Consuelo Sage MD, Phone:  [phone] RECOMMENDATIONS: 
None. Keep up the good work! LDL cholesterol is above goal--- Please call me if you have any questions: [phone] Sincerely, 
 
 
Vickey Sacks, MD

## 2021-03-25 NOTE — PATIENT INSTRUCTIONS
Office Policies Phone calls/patient messages: Please allow up to 24 hours for someone in the office to contact you about your call or message. Be mindful your provider may be out of the office or your message may require further review. We encourage you to use [employer] for your messages as this is a faster, more efficient way to communicate with our office Medication Refills: 
         
Prescription medications require 48-72 business hours to process. We encourage you to use [employer] for your refills. For controlled medications: Please allow 72 business hours to process. Certain medications may require you to  a written prescription at our office. NO narcotic/controlled medications will be prescribed after 4pm Monday through Friday or on weekends Form/Paperwork Completion: 
         
Please note a $25 fee may incur for all paperwork for completed by our providers. We ask that you allow 7-10 business days. Pre-payment is due prior to picking up/faxing the completed form. You may also download your forms to [employer] to have your doctor print off. Medicare Wellness Visit, Male The best way to live healthy is to have a lifestyle where you eat a well-balanced diet, exercise regularly, limit alcohol use, and quit all forms of tobacco/nicotine, if applicable. Regular preventive services are another way to keep healthy. Preventive services (vaccines, screening tests, monitoring & exams) can help personalize your care plan, which helps you manage your own care. Screening tests can find health problems at the earliest stages, when they are easiest to treat. [facility] follows the current, evidence-based guidelines published by the [facility] and [facility] [facility] (USPSTF) when recommending preventive services for our patients.  Because we follow these guidelines, sometimes recommendations change over time as research supports it. (For example, a prostate screening blood test is no longer routinely recommended for men with no symptoms). Of course, you and your doctor may decide to screen more often for some diseases, based on your risk and co-morbidities (chronic disease you are already diagnosed with). Preventive services for you include: - Medicare offers their members a free annual wellness visit, which is time for you and your primary care provider to discuss and plan for your preventive service needs. Take advantage of this benefit every year! 
-All adults over age 72 should receive the recommended pneumonia vaccines. Current USPSTF guidelines recommend a series of two vaccines for the best pneumonia protection.  
-All adults should have a flu vaccine yearly and tetanus vaccine every 10 years. 
-All adults age 48 and older should receive the shingles vaccines (series of two vaccines). -All adults age 38-68 who are overweight should have a diabetes screening test once every three years.  
-Other screening tests & preventive services for persons with diabetes include: an eye exam to screen for diabetic retinopathy, a kidney function test, a foot exam, and stricter control over your cholesterol.  
-Cardiovascular screening for adults with routine risk involves an electrocardiogram (ECG) at intervals determined by the provider.  
-Colorectal cancer screening should be done for adults age 54-65 with no increased risk factors for colorectal cancer. There are a number of acceptable methods of screening for this type of cancer. Each test has its own benefits and drawbacks. Discuss with your provider what is most appropriate for you during your annual wellness visit.  The different tests include: colonoscopy (considered the best screening method), a fecal occult blood test, a fecal DNA test, and sigmoidoscopy. 
-All adults born between [facility] should be screened once for Hepatitis C. 
-An Abdominal Aortic Aneurysm (AAA) Screening is recommended for men age 73-68 who has ever smoked in their lifetime. Here is a list of your current Health Maintenance items (your personalized list of preventive services) with a due date: 
Health Maintenance Due Topic Date Due  
 Hepatitis C Test  Never done  COVID-19 Vaccine (1) Never done  Shingles Vaccine (1 of 2) Never done  Pneumococcal Vaccine (1 of 1 - PPSV23) Never done

## 2021-08-09 ENCOUNTER — TELEPHONE (OUTPATIENT)
Dept: INTERNAL MEDICINE CLINIC | Age: 76
End: 2021-08-09

## 2021-08-09 NOTE — TELEPHONE ENCOUNTER
Patient call to report that he was in the [facility] ED 8/7/21 for left arm pain, SOB and chest pain. The ED discharge patient and instructed patient to follow up with his PCP and Cardiologist. Patient is scheduled to see his Cardiologist 8/11/21. Patient is wanting to know if he need to follow up with Dr. Chacha Wilson. Patient was advised to keep his appointment with his Cardiologist base on his history of cardiac issues and I will get advisement form Dr. Chacha Wilson when he need to see him.

## 2021-08-10 NOTE — TELEPHONE ENCOUNTER
Spoke with patient he was advise to follow up with his cardiologist. Dr. Estiven Pappas suggested patient will only need appointment with him if patient or the cardiologist thinks it is needed. this point and time patient is planning to see the cardiologist.

## 2021-09-21 ENCOUNTER — TELEPHONE (OUTPATIENT)
Dept: INTERNAL MEDICINE CLINIC | Age: 76
End: 2021-09-21

## 2021-09-21 NOTE — TELEPHONE ENCOUNTER
Patient is requesting to have labs ordered prior to appointment. He needs them ordered for [facility] as he will go near his home in [address].       [phone] home

## 2021-09-22 DIAGNOSIS — I25.10 CORONARY ARTERY DISEASE INVOLVING NATIVE CORONARY ARTERY OF NATIVE HEART WITHOUT ANGINA PECTORIS: Primary | ICD-10-CM

## 2021-09-22 DIAGNOSIS — E78.00 PURE HYPERCHOLESTEROLEMIA: ICD-10-CM

## 2021-09-22 DIAGNOSIS — I50.20 SYSTOLIC CONGESTIVE HEART FAILURE, UNSPECIFIED HF CHRONICITY (HCC): ICD-10-CM

## 2021-10-01 LAB
ALBUMIN SERPL-MCNC: 4.1 G/DL (ref 3.7–4.7)
ALBUMIN/GLOB SERPL: 1.5 {RATIO} (ref 1.2–2.2)
ALP SERPL-CCNC: 56 IU/L (ref 44–121)
ALT SERPL-CCNC: 39 IU/L (ref 0–44)
AST SERPL-CCNC: 33 IU/L (ref 0–40)
BILIRUB SERPL-MCNC: 0.8 MG/DL (ref 0–1.2)
BUN SERPL-MCNC: 18 MG/DL (ref 8–27)
BUN/CREAT SERPL: 14 (ref 10–24)
CALCIUM SERPL-MCNC: 9.3 MG/DL (ref 8.6–10.2)
CHLORIDE SERPL-SCNC: 104 MMOL/L (ref 96–106)
CHOLEST SERPL-MCNC: 164 MG/DL (ref 100–199)
CO2 SERPL-SCNC: 28 MMOL/L (ref 20–29)
CREAT SERPL-MCNC: 1.26 MG/DL (ref 0.76–1.27)
ERYTHROCYTE [DISTWIDTH] IN BLOOD BY AUTOMATED COUNT: 12.4 % (ref 11.6–15.4)
GLOBULIN SER CALC-MCNC: 2.8 G/DL (ref 1.5–4.5)
GLUCOSE SERPL-MCNC: 91 MG/DL (ref 65–99)
HCT VFR BLD AUTO: 43.3 % (ref 37.5–51)
HDLC SERPL-MCNC: 76 MG/DL
HGB BLD-MCNC: 14.5 G/DL (ref 13–17.7)
LDLC SERPL CALC-MCNC: 76 MG/DL (ref 0–99)
MCH RBC QN AUTO: 31.3 PG (ref 26.6–33)
MCHC RBC AUTO-ENTMCNC: 33.5 G/DL (ref 31.5–35.7)
MCV RBC AUTO: 94 FL (ref 79–97)
PLATELET # BLD AUTO: 179 X10E3/UL (ref 150–450)
POTASSIUM SERPL-SCNC: 4.4 MMOL/L (ref 3.5–5.2)
PROT SERPL-MCNC: 6.9 G/DL (ref 6–8.5)
RBC # BLD AUTO: 4.63 X10E6/UL (ref 4.14–5.8)
SODIUM SERPL-SCNC: 142 MMOL/L (ref 134–144)
TRIGL SERPL-MCNC: 59 MG/DL (ref 0–149)
TSH SERPL DL<=0.005 MIU/L-ACNC: 3.94 UIU/ML (ref 0.45–4.5)
VLDLC SERPL CALC-MCNC: 12 MG/DL (ref 5–40)
WBC # BLD AUTO: 4.5 X10E3/UL (ref 3.4–10.8)

## 2021-10-06 ENCOUNTER — OFFICE VISIT (OUTPATIENT)
Dept: INTERNAL MEDICINE CLINIC | Age: 76
End: 2021-10-06
Payer: MEDICARE

## 2021-10-06 VITALS
RESPIRATION RATE: 18 BRPM | DIASTOLIC BLOOD PRESSURE: 70 MMHG | HEART RATE: 70 BPM | BODY MASS INDEX: 21.82 KG/M2 | WEIGHT: 144 LBS | OXYGEN SATURATION: 98 % | HEIGHT: 68 IN | TEMPERATURE: 98.4 F | SYSTOLIC BLOOD PRESSURE: 120 MMHG

## 2021-10-06 DIAGNOSIS — E03.9 ACQUIRED HYPOTHYROIDISM: ICD-10-CM

## 2021-10-06 DIAGNOSIS — E78.00 PURE HYPERCHOLESTEROLEMIA: ICD-10-CM

## 2021-10-06 DIAGNOSIS — I25.10 CORONARY ARTERY DISEASE INVOLVING NATIVE CORONARY ARTERY OF NATIVE HEART WITHOUT ANGINA PECTORIS: ICD-10-CM

## 2021-10-06 DIAGNOSIS — Z11.59 ENCOUNTER FOR HEPATITIS C SCREENING TEST FOR LOW RISK PATIENT: Primary | ICD-10-CM

## 2021-10-06 PROCEDURE — G0463 HOSPITAL OUTPT CLINIC VISIT: HCPCS | Performed by: INTERNAL MEDICINE

## 2021-10-06 PROCEDURE — G8427 DOCREV CUR MEDS BY ELIG CLIN: HCPCS | Performed by: INTERNAL MEDICINE

## 2021-10-06 PROCEDURE — G8420 CALC BMI NORM PARAMETERS: HCPCS | Performed by: INTERNAL MEDICINE

## 2021-10-06 PROCEDURE — 1101F PT FALLS ASSESS-DOCD LE1/YR: CPT | Performed by: INTERNAL MEDICINE

## 2021-10-06 PROCEDURE — 99214 OFFICE O/P EST MOD 30 MIN: CPT | Performed by: INTERNAL MEDICINE

## 2021-10-06 PROCEDURE — G8432 DEP SCR NOT DOC, RNG: HCPCS | Performed by: INTERNAL MEDICINE

## 2021-10-06 PROCEDURE — G8536 NO DOC ELDER MAL SCRN: HCPCS | Performed by: INTERNAL MEDICINE

## 2021-10-06 RX ORDER — EZETIMIBE 10 MG/1
10 TABLET ORAL DAILY
Qty: 90 TABLET | Refills: 3 | Status: SHIPPED | OUTPATIENT
Start: 2021-10-06 | End: 2022-10-03

## 2021-10-06 NOTE — PROGRESS NOTES
HISTORY OF PRESENT ILLNESS  Chey Sanders is a 68 y.o. male. HPI   Fu CAD CHF-ef normal now hx V-tach HLD BPH  hypothyroid  Had recent labs LDL 76  TSH 3.9    Had fu with Dr Vitor Cruz for ER fu --ICD device was checked  His digoxin  , lisinopril were stopped and coreg dose lowered to 1/2 tab bid--has felt better since bp is up  Last OV      Had recent labs LDL 80  Brought AMD today      Had cardiac stent placed LAD /DESlast month for abnl stress test, left arm pain, sob and fatigue-plavix added to aspirin  Feels much better overall  Sees urologist yearly now--in [place]  Walking upstairs without difficulty or SOB     Sees DERM MD for skin checks and needs bx right cheek lesion  Has had chronic left maxillary sinus congestion and recent bleeding from left nares    Patient Active Problem List    Diagnosis Date Noted    S/P MVR (mitral valve replacement) 03/11/2021    Chronic systolic congestive heart failure ([address] 03/11/2021    Hx of ventricular tachycardia 03/11/2021    Coronary artery disease involving native coronary artery of native heart without angina pectoris 03/11/2021    Pure hypercholesterolemia 09/03/2020    Benign prostatic hyperplasia without lower urinary tract symptoms 09/03/2020     Current Outpatient Medications   Medication Sig Dispense Refill    levothyroxine (SYNTHROID) 50 mcg tablet Take  by mouth Daily (before breakfast).  atorvastatin (LIPITOR) 40 mg tablet TK 1 T PO ONCE D 90 Tab 3    clopidogreL (PLAVIX) 75 mg tab Take 1 Tab by mouth daily. 90 Tab 3    carvediloL (COREG) 6.25 mg tablet 1/2 tab BID      amiodarone (CORDARONE) 200 mg tablet TK 1 T PO ONCE D      finasteride (PROSCAR) 5 mg tablet TK 1 T PO QD      aspirin 81 mg tablet Take 325 mg by mouth daily.  amoxicillin (AMOXIL) 500 mg capsule Take 500 mg by mouth daily as needed.  Takes prior to dental procedures       Allergies   Allergen Reactions    Bacitracin Rash    Penicillin G Swelling      Lab Results Component Value Date/Time    WBC 4.5 09/30/2021 09:48 AM    HGB 14.5 09/30/2021 09:48 AM    HCT 43.3 09/30/2021 09:48 AM    PLATELET 251 75/69/4308 09:48 AM    MCV 94 09/30/2021 09:48 AM     Lab Results   Component Value Date/Time    Glucose 91 09/30/2021 09:48 AM    LDL, calculated 76 09/30/2021 09:48 AM    Creatinine 1.26 09/30/2021 09:48 AM      Lab Results   Component Value Date/Time    Cholesterol, total 164 09/30/2021 09:48 AM    HDL Cholesterol 76 09/30/2021 09:48 AM    LDL, calculated 76 09/30/2021 09:48 AM    Triglyceride 59 09/30/2021 09:48 AM     Lab Results   Component Value Date/Time    GFR est non-AA 55 (L) 09/30/2021 09:48 AM    GFR est AA 64 09/30/2021 09:48 AM    Creatinine 1.26 09/30/2021 09:48 AM    BUN 18 09/30/2021 09:48 AM    Sodium 142 09/30/2021 09:48 AM    Potassium 4.4 09/30/2021 09:48 AM    Chloride 104 09/30/2021 09:48 AM    CO2 28 09/30/2021 09:48 AM        ROS    Physical Exam  Vitals and nursing note reviewed. Constitutional:       General: He is not in acute distress. Appearance: He is well-developed. Comments: Appears stated age   HENT:      Head: Normocephalic. Cardiovascular:      Rate and Rhythm: Normal rate and regular rhythm. Heart sounds: Normal heart sounds. Pulmonary:      Effort: Pulmonary effort is normal.      Breath sounds: Normal breath sounds. Abdominal:      Palpations: Abdomen is soft. Neurological:      Mental Status: He is alert. ASSESSMENT and PLAN  Diagnoses and all orders for this visit:    1. Encounter for hepatitis C screening test for low risk patient  -     HEPATITIS C AB; Future    2. Pure hypercholesterolemia  -     ezetimibe (ZETIA) 10 mg tablet; Take 1 Tablet by mouth daily.  -     LIPID PANEL; Future   Add zeita 10 gm every day to statin-goal LDL <70    3 CAD   Stable no cp or angina    4.  Hx V tach s/p ICD    5 hypothyroid   Euthyroid clinically and by labs    Follow-up and Dispositions    · Return in about 6 months (around 4/6/2022) for medicare welness.

## 2021-11-18 LAB
CHOLEST SERPL-MCNC: 137 MG/DL (ref 100–199)
HCV AB S/CO SERPL IA: <0.1 S/CO RATIO (ref 0–0.9)
HDLC SERPL-MCNC: 63 MG/DL
LDLC SERPL CALC-MCNC: 62 MG/DL (ref 0–99)
TRIGL SERPL-MCNC: 58 MG/DL (ref 0–149)
VLDLC SERPL CALC-MCNC: 12 MG/DL (ref 5–40)

## 2022-03-18 PROBLEM — Z95.2 S/P MVR (MITRAL VALVE REPLACEMENT): Status: ACTIVE | Noted: 2021-03-11

## 2022-03-18 PROBLEM — Z86.79 HX OF VENTRICULAR TACHYCARDIA: Status: ACTIVE | Noted: 2021-03-11

## 2022-03-18 PROBLEM — N40.0 BENIGN PROSTATIC HYPERPLASIA WITHOUT LOWER URINARY TRACT SYMPTOMS: Status: ACTIVE | Noted: 2020-09-03

## 2022-03-19 PROBLEM — I25.10 CORONARY ARTERY DISEASE INVOLVING NATIVE CORONARY ARTERY OF NATIVE HEART WITHOUT ANGINA PECTORIS: Status: ACTIVE | Noted: 2021-03-11

## 2022-03-20 PROBLEM — E78.00 PURE HYPERCHOLESTEROLEMIA: Status: ACTIVE | Noted: 2020-09-03

## 2022-03-20 PROBLEM — I50.22 CHRONIC SYSTOLIC CONGESTIVE HEART FAILURE (HCC): Status: ACTIVE | Noted: 2021-03-11

## 2022-03-29 RX ORDER — ATORVASTATIN CALCIUM 40 MG/1
TABLET, FILM COATED ORAL
Qty: 90 TABLET | Refills: 3 | Status: SHIPPED | OUTPATIENT
Start: 2022-03-29

## 2022-04-26 ENCOUNTER — TELEPHONE (OUTPATIENT)
Dept: INTERNAL MEDICINE CLINIC | Age: 77
End: 2022-04-26

## 2022-04-26 NOTE — TELEPHONE ENCOUNTER
Patient would like to get lab orders faxed to [facility] in [address] [place] [address] prior to his appointment next week.  Thanks

## 2022-04-27 DIAGNOSIS — E03.9 ACQUIRED HYPOTHYROIDISM: ICD-10-CM

## 2022-04-27 DIAGNOSIS — I50.9 CONGESTIVE HEART FAILURE, UNSPECIFIED HF CHRONICITY, UNSPECIFIED HEART FAILURE TYPE (HCC): ICD-10-CM

## 2022-04-27 DIAGNOSIS — E78.00 PURE HYPERCHOLESTEROLEMIA: Primary | ICD-10-CM

## 2022-04-29 LAB
ALBUMIN SERPL-MCNC: 4.1 G/DL (ref 3.7–4.7)
ALBUMIN/GLOB SERPL: 1.8 {RATIO} (ref 1.2–2.2)
ALP SERPL-CCNC: 56 IU/L (ref 44–121)
ALT SERPL-CCNC: 48 IU/L (ref 0–44)
AST SERPL-CCNC: 41 IU/L (ref 0–40)
BILIRUB SERPL-MCNC: 0.7 MG/DL (ref 0–1.2)
BUN SERPL-MCNC: 15 MG/DL (ref 8–27)
BUN/CREAT SERPL: 14 (ref 10–24)
CALCIUM SERPL-MCNC: 8.9 MG/DL (ref 8.6–10.2)
CHLORIDE SERPL-SCNC: 104 MMOL/L (ref 96–106)
CHOLEST SERPL-MCNC: 124 MG/DL (ref 100–199)
CO2 SERPL-SCNC: 25 MMOL/L (ref 20–29)
CREAT SERPL-MCNC: 1.09 MG/DL (ref 0.76–1.27)
EGFR: 70 ML/MIN/1.73
ERYTHROCYTE [DISTWIDTH] IN BLOOD BY AUTOMATED COUNT: 12.1 % (ref 11.6–15.4)
GLOBULIN SER CALC-MCNC: 2.3 G/DL (ref 1.5–4.5)
GLUCOSE SERPL-MCNC: 91 MG/DL (ref 65–99)
HCT VFR BLD AUTO: 44.8 % (ref 37.5–51)
HDLC SERPL-MCNC: 56 MG/DL
HGB BLD-MCNC: 14.6 G/DL (ref 13–17.7)
LDLC SERPL CALC-MCNC: 55 MG/DL (ref 0–99)
MCH RBC QN AUTO: 30.1 PG (ref 26.6–33)
MCHC RBC AUTO-ENTMCNC: 32.6 G/DL (ref 31.5–35.7)
MCV RBC AUTO: 92 FL (ref 79–97)
PLATELET # BLD AUTO: 237 X10E3/UL (ref 150–450)
POTASSIUM SERPL-SCNC: 4 MMOL/L (ref 3.5–5.2)
PROT SERPL-MCNC: 6.4 G/DL (ref 6–8.5)
RBC # BLD AUTO: 4.85 X10E6/UL (ref 4.14–5.8)
SODIUM SERPL-SCNC: 142 MMOL/L (ref 134–144)
TRIGL SERPL-MCNC: 58 MG/DL (ref 0–149)
TSH SERPL DL<=0.005 MIU/L-ACNC: 8.48 UIU/ML (ref 0.45–4.5)
VLDLC SERPL CALC-MCNC: 13 MG/DL (ref 5–40)
WBC # BLD AUTO: 4.7 X10E3/UL (ref 3.4–10.8)

## 2022-05-03 ENCOUNTER — OFFICE VISIT (OUTPATIENT)
Dept: INTERNAL MEDICINE CLINIC | Age: 77
End: 2022-05-03
Payer: MEDICARE

## 2022-05-03 VITALS
HEART RATE: 61 BPM | WEIGHT: 152 LBS | RESPIRATION RATE: 16 BRPM | HEIGHT: 68 IN | DIASTOLIC BLOOD PRESSURE: 60 MMHG | SYSTOLIC BLOOD PRESSURE: 110 MMHG | TEMPERATURE: 99.1 F | OXYGEN SATURATION: 99 % | BODY MASS INDEX: 23.04 KG/M2

## 2022-05-03 DIAGNOSIS — R79.89 LFT ELEVATION: ICD-10-CM

## 2022-05-03 DIAGNOSIS — I25.10 CORONARY ARTERY DISEASE INVOLVING NATIVE CORONARY ARTERY OF NATIVE HEART WITHOUT ANGINA PECTORIS: ICD-10-CM

## 2022-05-03 DIAGNOSIS — Z00.00 MEDICARE ANNUAL WELLNESS VISIT, SUBSEQUENT: ICD-10-CM

## 2022-05-03 DIAGNOSIS — I50.22 CHRONIC SYSTOLIC CONGESTIVE HEART FAILURE (HCC): ICD-10-CM

## 2022-05-03 DIAGNOSIS — E78.5 HYPERLIPIDEMIA, UNSPECIFIED HYPERLIPIDEMIA TYPE: ICD-10-CM

## 2022-05-03 DIAGNOSIS — E03.9 ACQUIRED HYPOTHYROIDISM: Primary | ICD-10-CM

## 2022-05-03 PROCEDURE — G0439 PPPS, SUBSEQ VISIT: HCPCS | Performed by: INTERNAL MEDICINE

## 2022-05-03 PROCEDURE — G8510 SCR DEP NEG, NO PLAN REQD: HCPCS | Performed by: INTERNAL MEDICINE

## 2022-05-03 PROCEDURE — 1101F PT FALLS ASSESS-DOCD LE1/YR: CPT | Performed by: INTERNAL MEDICINE

## 2022-05-03 PROCEDURE — G0463 HOSPITAL OUTPT CLINIC VISIT: HCPCS | Performed by: INTERNAL MEDICINE

## 2022-05-03 PROCEDURE — G8427 DOCREV CUR MEDS BY ELIG CLIN: HCPCS | Performed by: INTERNAL MEDICINE

## 2022-05-03 PROCEDURE — 99214 OFFICE O/P EST MOD 30 MIN: CPT | Performed by: INTERNAL MEDICINE

## 2022-05-03 PROCEDURE — G8536 NO DOC ELDER MAL SCRN: HCPCS | Performed by: INTERNAL MEDICINE

## 2022-05-03 PROCEDURE — G8420 CALC BMI NORM PARAMETERS: HCPCS | Performed by: INTERNAL MEDICINE

## 2022-05-03 RX ORDER — LEVOTHYROXINE SODIUM 75 UG/1
75 TABLET ORAL
Qty: 90 TABLET | Refills: 3 | Status: SHIPPED | OUTPATIENT
Start: 2022-05-03

## 2022-05-03 NOTE — PROGRESS NOTES
HISTORY OF PRESENT ILLNESS  Azeb Magallon is a 68 y.o. male. HPI   Azeb Magallon is a 68 y.o. male. Fu CAD CHF-ef normal now hx V-tach HLD BPH  hypothyroid and medicare wellness----  Saw Dr Amaya Owens in January  Had Fall in February this year--no injury. Went to ER hit left temple--head CT -negative in [place]  Had another ER visit for CP--dx indigestion  Sees [facility] MD in [place]  Recent labs  LDL 55   ast /alt 41/48    Last OV  Had recent labs LDL 76  TSH 3.9     Had fu with Dr Amaya Owens for ER fu --ICD device was checked  His digoxin  , lisinopril were stopped and coreg dose lowered to 1/2 tab bid--has felt better since bp is up    Patient Active Problem List    Diagnosis Date Noted    S/P MVR (mitral valve replacement) 03/11/2021    Chronic systolic congestive heart failure ([address] 03/11/2021    Hx of ventricular tachycardia 03/11/2021    Coronary artery disease involving native coronary artery of native heart without angina pectoris 03/11/2021    Pure hypercholesterolemia 09/03/2020    Benign prostatic hyperplasia without lower urinary tract symptoms 09/03/2020     Current Outpatient Medications   Medication Sig Dispense Refill    atorvastatin (LIPITOR) 40 mg tablet TAKE 1 TABLET BY MOUTH EVERY DAY 90 Tablet 3    ezetimibe (ZETIA) 10 mg tablet Take 1 Tablet by mouth daily. 90 Tablet 3    levothyroxine (SYNTHROID) 50 mcg tablet Take  by mouth Daily (before breakfast).  clopidogreL (PLAVIX) 75 mg tab Take 1 Tab by mouth daily. 90 Tab 3    carvediloL (COREG) 6.25 mg tablet 1/2 tab BID      amiodarone (CORDARONE) 200 mg tablet TK 1 T PO ONCE D      finasteride (PROSCAR) 5 mg tablet TK 1 T PO QD      amoxicillin (AMOXIL) 500 mg capsule Take 500 mg by mouth daily as needed. Takes prior to dental procedures      aspirin 81 mg tablet Take 325 mg by mouth daily.        Allergies   Allergen Reactions    Bacitracin Rash    Penicillin G Swelling      Lab Results   Component Value Date/Time WBC 4.7 04/28/2022 09:37 AM    HGB 14.6 04/28/2022 09:37 AM    HCT 44.8 04/28/2022 09:37 AM    PLATELET 393 07/22/8388 09:37 AM    MCV 92 04/28/2022 09:37 AM     Lab Results   Component Value Date/Time    Glucose 91 04/28/2022 09:37 AM    LDL, calculated 55 04/28/2022 09:37 AM    Creatinine 1.09 04/28/2022 09:37 AM      Lab Results   Component Value Date/Time    Cholesterol, total 124 04/28/2022 09:37 AM    HDL Cholesterol 56 04/28/2022 09:37 AM    LDL, calculated 55 04/28/2022 09:37 AM    Triglyceride 58 04/28/2022 09:37 AM     Lab Results   Component Value Date/Time    GFR est non-AA 55 (L) 09/30/2021 09:48 AM    GFR est AA 64 09/30/2021 09:48 AM    Creatinine 1.09 04/28/2022 09:37 AM    BUN 15 04/28/2022 09:37 AM    Sodium 142 04/28/2022 09:37 AM    Potassium 4.0 04/28/2022 09:37 AM    Chloride 104 04/28/2022 09:37 AM    CO2 25 04/28/2022 09:37 AM        ROS    Physical Exam  Vitals and nursing note reviewed. Constitutional:       General: He is not in acute distress. Appearance: He is well-developed. Comments: Appears stated age   HENT:      Head: Normocephalic. Cardiovascular:      Rate and Rhythm: Normal rate and regular rhythm. Heart sounds: Normal heart sounds. Pulmonary:      Effort: Pulmonary effort is normal.      Breath sounds: Normal breath sounds. Abdominal:      Palpations: Abdomen is soft. Musculoskeletal:         General: Normal range of motion. Cervical back: Normal range of motion. Right lower leg: No edema. Left lower leg: No edema. Neurological:      General: No focal deficit present. Mental Status: He is alert. Psychiatric:         Mood and Affect: Mood normal.         ASSESSMENT and PLAN  Diagnoses and all orders for this visit:    1. Acquired hypothyroidism  -     TSH 3RD GENERATION; Future   On amiodarone   Increase levothyroxine 75 mcg every day--repeat tsh in 6 weeks  2.  Chronic systolic congestive heart failure ([address]    coepensated   Fu  Kamille  3. LFT elevation  -     AST; Future  -     ALT; Future   May need US if still elevated   On amiodarone  4. Coronary artery disease involving native coronary artery of native heart without angina pectoris   No cp or angina  5. Hyperlipidemia, unspecified hyperlipidemia type   LDL at goal on statin plus zetia  Other orders  -     levothyroxine (SYNTHROID) 75 mcg tablet; Take 1 Tablet by mouth Daily (before breakfast). Follow-up and Dispositions    · Return in about 6 months (around 11/3/2022) for hld hypothyroid cad . This is the Subsequent Medicare Annual Wellness Exam, performed 12 months or more after the Initial AWV or the last Subsequent AWV    I have reviewed the patient's medical history in detail and updated the computerized patient record. Assessment/Plan   Education and counseling provided:  Are appropriate based on today's review and evaluation  End-of-Life planning (with patient's consent)  covid booster,  and then pcv  recommended    1. Chronic systolic congestive heart failure ([address]     2. Depression Risk Factor Screening     3 most recent PHQ Screens 5/3/2022   Little interest or pleasure in doing things Several days   Feeling down, depressed, irritable, or hopeless Several days   Total Score PHQ 2 2       Alcohol & Drug Abuse Risk Screen    Do you average more than 1 drink per night or more than 7 drinks a week: No    In the past three months have you have had more than 4 drinks containing alcohol on one occasion: No          Functional Ability and Level of Safety    Hearing: The patient wears hearing aids. Activities of Daily Living: The home contains: grab bars  Patient does total self care      Ambulation: with no difficulty     Fall Risk:  Fall Risk Assessment, last 12 mths 5/3/2022   Able to walk? Yes   Fall in past 12 months? 1   Do you feel unsteady? 0   Are you worried about falling 0   Is TUG test greater than 12 seconds?  0   Is the gait abnormal? 0   Fall with injury? 1      Abuse Screen:  Patient is not abused       Cognitive Screening    Has your family/caregiver stated any concerns about your memory: no     Cognitive Screening: Normal - Verbal Fluency Test    Health Maintenance Due     Health Maintenance Due   Topic Date Due    Shingrix Vaccine Age 49> (1 of 2) Never done    Pneumococcal 65+ years (2 - PCV) 05/11/2011    COVID-19 Vaccine (3 - Booster for Pfizer series) 03/12/2022    Medicare Yearly Exam  03/26/2022       Patient Care Team   Patient Care Team:  Dudley Baptiste MD as PCP - General (Internal Medicine)  Dudley Baptiste MD as PCP [facility] Provider    History     Patient Active Problem List   Diagnosis Code    Pure hypercholesterolemia E78.00    Benign prostatic hyperplasia without lower urinary tract symptoms N40.0    S/P MVR (mitral valve replacement) Z95.2    Chronic systolic congestive heart failure ([address] I50.22    Hx of ventricular tachycardia Z86.79    Coronary artery disease involving native coronary artery of native heart without angina pectoris I25.10     Past Medical History:   Diagnosis Date    Asthma     GERD (gastroesophageal reflux disease)     Kidney stones     Mitral valve prolapse       Past Surgical History:   Procedure Laterality Date    HX GI      appencectomy  with kinked intestine    HX MITRAL VALVE REPLACEMENT  2014    s/p Mitral valve repair    HX ORTHOPAEDIC  2001    achilies tendon rupture    HX UROLOGICAL      kidney stone removed    OR ABDOMEN SURGERY PROC UNLISTED  1982 1984    hernia repair    OR COLONOSCOPY FLX DX W/COLLJ SPEC WHEN PFRMD  6/11/2013          Current Outpatient Medications   Medication Sig Dispense Refill    atorvastatin (LIPITOR) 40 mg tablet TAKE 1 TABLET BY MOUTH EVERY DAY 90 Tablet 3    ezetimibe (ZETIA) 10 mg tablet Take 1 Tablet by mouth daily. 90 Tablet 3    levothyroxine (SYNTHROID) 50 mcg tablet Take  by mouth Daily (before breakfast).       clopidogreL (PLAVIX) 75 mg tab Take 1 Tab by mouth daily. 90 Tab 3    carvediloL (COREG) 6.25 mg tablet 1/2 tab BID      amiodarone (CORDARONE) 200 mg tablet TK 1 T PO ONCE D      finasteride (PROSCAR) 5 mg tablet TK 1 T PO QD      amoxicillin (AMOXIL) 500 mg capsule Take 500 mg by mouth daily as needed. Takes prior to dental procedures      aspirin 81 mg tablet Take 325 mg by mouth daily.        Allergies   Allergen Reactions    Bacitracin Rash    Penicillin G Swelling       Family History   Problem Relation Age of Onset    Hypertension Father 46     Social History     Tobacco Use    Smoking status: Never Smoker    Smokeless tobacco: Never Used   Substance Use Topics    Alcohol use: Yes     Comment: occassional-moderate         Anil Paiz MD

## 2022-05-03 NOTE — PROGRESS NOTES
1. \"Have you been to the ER, urgent care clinic since your last visit? Hospitalized since your last visit? \"  Yes, ED @ [facility] 2/11/22. 2/6/22    2. \"Have you seen or consulted any other health care providers outside of the [address] [place] [address] since your last visit? \" No    3. For patients aged 39-70: Has the patient had a colonoscopy / FIT/ Cologuard?  Yes

## 2022-05-03 NOTE — PATIENT INSTRUCTIONS
Office Policies    Phone calls/patient messages:            Please allow up to 24 hours for someone in the office to contact you about your call or message. Be mindful your provider may be out of the office or your message may require further review. We encourage you to use [employer]ivered for your messages as this is a faster, more efficient way to communicate with our office                         Medication Refills:            Prescription medications require 48-72 business hours to process. We encourage you to use Delivered for your refills. For controlled medications: Please allow 72 business hours to process. Certain medications may require you to  a written prescription at our office. NO narcotic/controlled medications will be prescribed after 4pm Monday through Friday or on weekends              Form/Paperwork Completion:            Please note a $25 fee may incur for all paperwork for completed by our providers. We ask that you allow 7-10 business days. Pre-payment is due prior to picking up/faxing the completed form. You may also download your forms to Delivered to have your doctor print off. Medicare Wellness Visit, Male    The best way to live healthy is to have a lifestyle where you eat a well-balanced diet, exercise regularly, limit alcohol use, and quit all forms of tobacco/nicotine, if applicable. Regular preventive services are another way to keep healthy. Preventive services (vaccines, screening tests, monitoring & exams) can help personalize your care plan, which helps you manage your own care. Screening tests can find health problems at the earliest stages, when they are easiest to treat. [facility] follows the current, evidence-based guidelines published by the [facility] [place] (USPSTF) when recommending preventive services for our patients.  Because we follow these guidelines, sometimes recommendations change over time as research supports it. (For example, a prostate screening blood test is no longer routinely recommended for men with no symptoms). Of course, you and your doctor may decide to screen more often for some diseases, based on your risk and co-morbidities (chronic disease you are already diagnosed with). Preventive services for you include:  - Medicare offers their members a free annual wellness visit, which is time for you and your primary care provider to discuss and plan for your preventive service needs. Take advantage of this benefit every year!  -All adults over age 72 should receive the recommended pneumonia vaccines. Current USPSTF guidelines recommend a series of two vaccines for the best pneumonia protection.   -All adults should have a flu vaccine yearly and tetanus vaccine every 10 years.  -All adults age 48 and older should receive the shingles vaccines (series of two vaccines). -All adults age 38-68 who are overweight should have a diabetes screening test once every three years.   -Other screening tests & preventive services for persons with diabetes include: an eye exam to screen for diabetic retinopathy, a kidney function test, a foot exam, and stricter control over your cholesterol.   -Cardiovascular screening for adults with routine risk involves an electrocardiogram (ECG) at intervals determined by the provider.   -Colorectal cancer screening should be done for adults age 54-65 with no increased risk factors for colorectal cancer. There are a number of acceptable methods of screening for this type of cancer. Each test has its own benefits and drawbacks. Discuss with your provider what is most appropriate for you during your annual wellness visit.  The different tests include: colonoscopy (considered the best screening method), a fecal occult blood test, a fecal DNA test, and sigmoidoscopy.  -All adults born between [facility] should be screened once for Hepatitis C.  -An Abdominal Aortic Aneurysm (AAA) Screening is recommended for men age 73-68 who has ever smoked in their lifetime.      Here is a list of your current Health Maintenance items (your personalized list of preventive services) with a due date:  Health Maintenance Due   Topic Date Due    Shingles Vaccine (1 of 2) Never done    Pneumococcal Vaccine (2 - PCV) 05/11/2011    COVID-19 Vaccine (3 - Booster for Pfizer series) 03/12/2022    Annual Well Visit  03/26/2022

## 2022-06-11 LAB
ALT SERPL-CCNC: 33 IU/L (ref 0–44)
AST SERPL-CCNC: 31 IU/L (ref 0–40)
TSH SERPL DL<=0.005 MIU/L-ACNC: 2.61 UIU/ML (ref 0.45–4.5)

## 2022-10-03 DIAGNOSIS — E78.00 PURE HYPERCHOLESTEROLEMIA: ICD-10-CM

## 2022-10-03 RX ORDER — EZETIMIBE 10 MG/1
10 TABLET ORAL DAILY
Qty: 90 TABLET | Refills: 3 | Status: SHIPPED | OUTPATIENT
Start: 2022-10-03

## 2022-11-03 DIAGNOSIS — E03.9 HYPOTHYROIDISM, UNSPECIFIED TYPE: ICD-10-CM

## 2022-11-03 DIAGNOSIS — E78.5 HYPERLIPIDEMIA, UNSPECIFIED HYPERLIPIDEMIA TYPE: ICD-10-CM

## 2022-11-03 DIAGNOSIS — I25.10 CORONARY ARTERY DISEASE INVOLVING NATIVE CORONARY ARTERY OF NATIVE HEART WITHOUT ANGINA PECTORIS: Primary | ICD-10-CM

## 2022-11-05 LAB
ALBUMIN SERPL-MCNC: 3.9 G/DL (ref 3.7–4.7)
ALBUMIN/GLOB SERPL: 1.6 {RATIO} (ref 1.2–2.2)
ALP SERPL-CCNC: 69 IU/L (ref 44–121)
ALT SERPL-CCNC: 30 IU/L (ref 0–44)
AST SERPL-CCNC: 31 IU/L (ref 0–40)
BILIRUB SERPL-MCNC: 0.7 MG/DL (ref 0–1.2)
BUN SERPL-MCNC: 16 MG/DL (ref 8–27)
BUN/CREAT SERPL: 16 (ref 10–24)
CALCIUM SERPL-MCNC: 8.8 MG/DL (ref 8.6–10.2)
CHLORIDE SERPL-SCNC: 103 MMOL/L (ref 96–106)
CHOLEST SERPL-MCNC: 128 MG/DL (ref 100–199)
CO2 SERPL-SCNC: 27 MMOL/L (ref 20–29)
CREAT SERPL-MCNC: 1.01 MG/DL (ref 0.76–1.27)
EGFR: 77 ML/MIN/1.73
GLOBULIN SER CALC-MCNC: 2.5 G/DL (ref 1.5–4.5)
GLUCOSE SERPL-MCNC: 86 MG/DL (ref 70–99)
HDLC SERPL-MCNC: 62 MG/DL
LDLC SERPL CALC-MCNC: 53 MG/DL (ref 0–99)
POTASSIUM SERPL-SCNC: 4.4 MMOL/L (ref 3.5–5.2)
PROT SERPL-MCNC: 6.4 G/DL (ref 6–8.5)
SODIUM SERPL-SCNC: 141 MMOL/L (ref 134–144)
TRIGL SERPL-MCNC: 58 MG/DL (ref 0–149)
TSH SERPL DL<=0.005 MIU/L-ACNC: 0.97 UIU/ML (ref 0.45–4.5)
VLDLC SERPL CALC-MCNC: 13 MG/DL (ref 5–40)

## 2022-11-08 ENCOUNTER — OFFICE VISIT (OUTPATIENT)
Dept: INTERNAL MEDICINE CLINIC | Age: 77
End: 2022-11-08
Payer: MEDICARE

## 2022-11-08 VITALS
TEMPERATURE: 97.2 F | BODY MASS INDEX: 22.43 KG/M2 | WEIGHT: 148 LBS | DIASTOLIC BLOOD PRESSURE: 70 MMHG | SYSTOLIC BLOOD PRESSURE: 125 MMHG | OXYGEN SATURATION: 98 % | RESPIRATION RATE: 17 BRPM | HEART RATE: 85 BPM | HEIGHT: 68 IN

## 2022-11-08 DIAGNOSIS — E78.5 HYPERLIPIDEMIA, UNSPECIFIED HYPERLIPIDEMIA TYPE: ICD-10-CM

## 2022-11-08 DIAGNOSIS — E03.9 ACQUIRED HYPOTHYROIDISM: ICD-10-CM

## 2022-11-08 DIAGNOSIS — I25.10 CORONARY ARTERY DISEASE INVOLVING NATIVE CORONARY ARTERY OF NATIVE HEART WITHOUT ANGINA PECTORIS: Primary | ICD-10-CM

## 2022-11-08 DIAGNOSIS — N40.1 BENIGN PROSTATIC HYPERPLASIA WITH LOWER URINARY TRACT SYMPTOMS, SYMPTOM DETAILS UNSPECIFIED: ICD-10-CM

## 2022-11-08 PROCEDURE — 99213 OFFICE O/P EST LOW 20 MIN: CPT | Performed by: INTERNAL MEDICINE

## 2022-11-08 PROCEDURE — G0463 HOSPITAL OUTPT CLINIC VISIT: HCPCS | Performed by: INTERNAL MEDICINE

## 2022-11-08 PROCEDURE — G8427 DOCREV CUR MEDS BY ELIG CLIN: HCPCS | Performed by: INTERNAL MEDICINE

## 2022-11-08 PROCEDURE — 1101F PT FALLS ASSESS-DOCD LE1/YR: CPT | Performed by: INTERNAL MEDICINE

## 2022-11-08 PROCEDURE — G8510 SCR DEP NEG, NO PLAN REQD: HCPCS | Performed by: INTERNAL MEDICINE

## 2022-11-08 PROCEDURE — G8536 NO DOC ELDER MAL SCRN: HCPCS | Performed by: INTERNAL MEDICINE

## 2022-11-08 PROCEDURE — G8420 CALC BMI NORM PARAMETERS: HCPCS | Performed by: INTERNAL MEDICINE

## 2022-11-08 NOTE — PROGRESS NOTES
HISTORY OF PRESENT ILLNESS  Maude Plunkett is a 68 y.o. male. HPI  Fu CAD s/p 1 v cabg  2014  s/p MV repair ,CHF-ef normal now hx V-tach s/p AICD, hx afutter s/p CV, HLD BPH  hypothyroid   Had recent labs cmp wnl LDL 53 tsh 0.969  CHF sxs  Sees Dr Franko Lainez months--s/p stent 2 yrs ago 2021--no cp or angina  On amiodaraonefor hx vfib--tsh and lft ok on recent labs  Says very busy and active  Retired   Nonsmoker, etoh 1-2 shots per day-bourbon but seasonal  Recent  cmp lipids tsh  Sees [facility] md next monthfor PSA and BPH  Last OF  Saw Dr María Leroy in January  Had Fall in February this year--no injury. Went to ER hit left temple--head CT -negative in [place]  Had another ER visit for CP--dx indigestion  Sees [facility] MD in [place]  Recent labs  LDL 54   ast /alt 41/48     Last OV    Patient Active Problem List    Diagnosis Date Noted    S/P MVR (mitral valve replacement) 03/11/2021    Chronic systolic congestive heart failure ([address] 03/11/2021    Hx of ventricular tachycardia 03/11/2021    Coronary artery disease involving native coronary artery of native heart without angina pectoris 03/11/2021    Pure hypercholesterolemia 09/03/2020    Benign prostatic hyperplasia without lower urinary tract symptoms 09/03/2020     Current Outpatient Medications   Medication Sig Dispense Refill    ezetimibe (ZETIA) 10 mg tablet TAKE 1 TABLET BY MOUTH DAILY 90 Tablet 3    levothyroxine (SYNTHROID) 75 mcg tablet Take 1 Tablet by mouth Daily (before breakfast). 90 Tablet 3    atorvastatin (LIPITOR) 40 mg tablet TAKE 1 TABLET BY MOUTH EVERY DAY 90 Tablet 3    clopidogreL (PLAVIX) 75 mg tab Take 1 Tab by mouth daily. 90 Tab 3    carvediloL (COREG) 6.25 mg tablet 1/2 tab BID      amiodarone (CORDARONE) 200 mg tablet TK 1 T PO ONCE D      finasteride (PROSCAR) 5 mg tablet TK 1 T PO QD      amoxicillin (AMOXIL) 500 mg capsule Take 500 mg by mouth daily as needed.  Takes prior to dental procedures      aspirin 81 mg tablet Take 325 mg by mouth daily. Allergies   Allergen Reactions    Peanut Anaphylaxis     As a child    Penicillin G Swelling    Bacitracin Rash and Swelling     Eyes swelled up to almost closed. Lab Results   Component Value Date/Time    WBC 4.7 04/28/2022 09:37 AM    HGB 14.6 04/28/2022 09:37 AM    HCT 44.8 04/28/2022 09:37 AM    PLATELET 446 01/28/7184 09:37 AM    MCV 92 04/28/2022 09:37 AM     Lab Results   Component Value Date/Time    Glucose 86 11/04/2022 10:23 AM    LDL, calculated 53 11/04/2022 10:23 AM    Creatinine 1.01 11/04/2022 10:23 AM      Lab Results   Component Value Date/Time    Cholesterol, total 128 11/04/2022 10:23 AM    HDL Cholesterol 62 11/04/2022 10:23 AM    LDL, calculated 53 11/04/2022 10:23 AM    Triglyceride 58 11/04/2022 10:23 AM     Lab Results   Component Value Date/Time    GFR est non-AA 55 (L) 09/30/2021 09:48 AM    GFR est AA 64 09/30/2021 09:48 AM    Creatinine 1.01 11/04/2022 10:23 AM    BUN 16 11/04/2022 10:23 AM    Sodium 141 11/04/2022 10:23 AM    Potassium 4.4 11/04/2022 10:23 AM    Chloride 103 11/04/2022 10:23 AM    CO2 27 11/04/2022 10:23 AM        ROS    Physical Exam  Vitals and nursing note reviewed. Constitutional:       General: He is not in acute distress. Appearance: Normal appearance. He is well-developed. Comments: Appears stated age   HENT:      Head: Normocephalic. Cardiovascular:      Rate and Rhythm: Normal rate and regular rhythm. Heart sounds: Normal heart sounds. Pulmonary:      Effort: Pulmonary effort is normal.      Breath sounds: Normal breath sounds. Abdominal:      Palpations: Abdomen is soft. Musculoskeletal:      Right lower leg: No edema. Left lower leg: No edema. Neurological:      Mental Status: He is alert. ASSESSMENT and PLAN    ICD-10-CM ICD-9-CM    1.  Coronary artery disease involving native coronary artery of native heart without angina pectoris  I25.10 414.01 Stable, no angina  Fu Dr Rochelle Isaac  On [facility] for hx Vtach-labs ok      2. Hyperlipidemia, unspecified hyperlipidemia type  E78.5 272.4 LDL at goal      3. Benign prostatic hyperplasia with lower urinary tract symptoms, symptom details unspecified  N40.1 600.01 Fu URO MD       4.  Acquired hypothyroidism  E03.9 244.9 Euthyroid    Rtc 6 months medicare wellness

## 2023-03-28 ENCOUNTER — HOSPITAL ENCOUNTER (OUTPATIENT)
Age: 78
Setting detail: OUTPATIENT SURGERY
Discharge: HOME | End: 2023-03-28
Attending: INTERNAL MEDICINE | Admitting: INTERNAL MEDICINE
Payer: MEDICARE

## 2023-03-28 VITALS
RESPIRATION RATE: 12 BRPM | BODY MASS INDEX: 21.22 KG/M2 | TEMPERATURE: 98.1 F | OXYGEN SATURATION: 100 % | HEART RATE: 87 BPM | WEIGHT: 140 LBS | SYSTOLIC BLOOD PRESSURE: 140 MMHG | HEIGHT: 68 IN | DIASTOLIC BLOOD PRESSURE: 73 MMHG

## 2023-03-28 DIAGNOSIS — R07.9 CHEST PAIN, UNSPECIFIED TYPE: ICD-10-CM

## 2023-03-28 PROCEDURE — 77030008543 HC TBNG MON PRSS MRTM -A: Performed by: INTERNAL MEDICINE

## 2023-03-28 PROCEDURE — 76937 US GUIDE VASCULAR ACCESS: CPT | Performed by: INTERNAL MEDICINE

## 2023-03-28 PROCEDURE — C1894 INTRO/SHEATH, NON-LASER: HCPCS | Performed by: INTERNAL MEDICINE

## 2023-03-28 PROCEDURE — 99152 MOD SED SAME PHYS/QHP 5/>YRS: CPT | Performed by: INTERNAL MEDICINE

## 2023-03-28 PROCEDURE — 77030010221 HC SPLNT WR POS TELE -B: Performed by: INTERNAL MEDICINE

## 2023-03-28 PROCEDURE — 74011000636 HC RX REV CODE- 636: Performed by: INTERNAL MEDICINE

## 2023-03-28 PROCEDURE — 74011250636 HC RX REV CODE- 250/636: Performed by: INTERNAL MEDICINE

## 2023-03-28 PROCEDURE — 74011000250 HC RX REV CODE- 250: Performed by: INTERNAL MEDICINE

## 2023-03-28 PROCEDURE — 77030019569 HC BND COMPR RAD TERU -B: Performed by: INTERNAL MEDICINE

## 2023-03-28 PROCEDURE — 77030015766: Performed by: INTERNAL MEDICINE

## 2023-03-28 PROCEDURE — 93459 L HRT ART/GRFT ANGIO: CPT | Performed by: INTERNAL MEDICINE

## 2023-03-28 PROCEDURE — 77030019698 HC SYR ANGI MDLON MRTM -A: Performed by: INTERNAL MEDICINE

## 2023-03-28 PROCEDURE — 2709999900 HC NON-CHARGEABLE SUPPLY: Performed by: INTERNAL MEDICINE

## 2023-03-28 PROCEDURE — 74011250637 HC RX REV CODE- 250/637: Performed by: INTERNAL MEDICINE

## 2023-03-28 RX ORDER — HEPARIN SODIUM 200 [USP'U]/100ML
INJECTION, SOLUTION INTRAVENOUS
Status: COMPLETED | OUTPATIENT
Start: 2023-03-28 | End: 2023-03-28

## 2023-03-28 RX ORDER — VERAPAMIL HYDROCHLORIDE 2.5 MG/ML
INJECTION, SOLUTION INTRAVENOUS AS NEEDED
Status: DISCONTINUED | OUTPATIENT
Start: 2023-03-28 | End: 2023-03-28 | Stop reason: HOSPADM

## 2023-03-28 RX ORDER — SODIUM CHLORIDE 0.9 % (FLUSH) 0.9 %
5-40 SYRINGE (ML) INJECTION EVERY 8 HOURS
Status: CANCELLED | OUTPATIENT
Start: 2023-03-28

## 2023-03-28 RX ORDER — MIDAZOLAM HYDROCHLORIDE 1 MG/ML
INJECTION, SOLUTION INTRAMUSCULAR; INTRAVENOUS AS NEEDED
Status: DISCONTINUED | OUTPATIENT
Start: 2023-03-28 | End: 2023-03-28 | Stop reason: HOSPADM

## 2023-03-28 RX ORDER — AMMONIUM LACTATE 12 G/100G
LOTION TOPICAL AS NEEDED
COMMUNITY

## 2023-03-28 RX ORDER — SODIUM CHLORIDE 0.9 % (FLUSH) 0.9 %
5-40 SYRINGE (ML) INJECTION AS NEEDED
Status: CANCELLED | OUTPATIENT
Start: 2023-03-28

## 2023-03-28 RX ORDER — LIDOCAINE HYDROCHLORIDE 10 MG/ML
INJECTION, SOLUTION EPIDURAL; INFILTRATION; INTRACAUDAL; PERINEURAL AS NEEDED
Status: DISCONTINUED | OUTPATIENT
Start: 2023-03-28 | End: 2023-03-28 | Stop reason: HOSPADM

## 2023-03-28 RX ORDER — HEPARIN SODIUM 1000 [USP'U]/ML
INJECTION, SOLUTION INTRAVENOUS; SUBCUTANEOUS AS NEEDED
Status: DISCONTINUED | OUTPATIENT
Start: 2023-03-28 | End: 2023-03-28 | Stop reason: HOSPADM

## 2023-03-28 RX ORDER — FENTANYL CITRATE 50 UG/ML
INJECTION, SOLUTION INTRAMUSCULAR; INTRAVENOUS AS NEEDED
Status: DISCONTINUED | OUTPATIENT
Start: 2023-03-28 | End: 2023-03-28 | Stop reason: HOSPADM

## 2023-03-28 RX ORDER — GUAIFENESIN 100 MG/5ML
81 LIQUID (ML) ORAL
Status: COMPLETED | OUTPATIENT
Start: 2023-03-28 | End: 2023-03-28

## 2023-03-28 RX ADMIN — ASPIRIN 81 MG: 81 TABLET, CHEWABLE ORAL at 09:32

## 2023-03-28 NOTE — PROGRESS NOTES
Primary Nurse Shanti Nunez RN and Ingrid Yadav RN performed a dual skin assessment on this patient No impairment noted  Vasyl score is 23  Mepilex applied to sacrum

## 2023-03-28 NOTE — PROGRESS NOTES
Cardiac Cath Lab Recovery Arrival Note:      Margarita Nuñez arrived to Cardiac Cath Lab, Recovery Area. Staff introduced to patient. Patient identifiers verified with NAME and DATE OF BIRTH. Procedure verified with patient. Consent forms reviewed and signed by patient or authorized representative and verified. Allergies verified. Patient and family oriented to department. Patient and family informed of procedure and plan of care. Questions answered with review. Patient prepped for procedure, per orders from physician, prior to arrival.    Patient on cardiac monitor, non-invasive blood pressure, SPO2 monitor. On room air. Patient is A&Ox 4. Patient reports no complaints. Patient in stretcher, in low position, with side rails up, call bell within reach, patient instructed to call if assistance as needed. Patient prep in: [address], [place].    Patient family has pager # na  Family in: [place] (niece) in lobby   Prep by: Ancelmo Macias

## 2023-03-28 NOTE — H&P
HISTORY OF PRESENT ILLNESS:   2/23:  Doing ok; had Mohs surgery on his nose and since has been having exertional left upper and SSCP radiating down his left arm similar to prior PCIs. No nausea/sweats. No SOB, orthopnea, PND, ROSA, syncope.  8/22: Doing well. Having some issues with ingrown toenail, seeing podiatrist. Close friends Dr Kathie Daniels at [facility] ER. No CP, HENDRICKSON, palps, edema or syncope. Stays active. 1/22:  Doing well; feels like a new man with the med changes from the last appt. No CP or SOB.    8/21: For weeks has felt poorly; fatigued and having similar symptoms to prior to his PCI in the Spring. Went ot the ER w/ a negative workup. Says his BP is 90s-100s when he wakes up. Some LH/dizzy. Says when he gets moving around he feels a little better. Easily naps in the afternoon. Feels like he has \"mono\". 3/21:  Feels great since PCI. No complaints. Active w/o issues. 1/21:  Doing well; in Sept he tried to cut his 325mg ASA down to 81 in a stepwise fashion; he got to 162mg and began having left arm pain, SOB, and dizziness that was very reminiscent of his bypass. He took ASA 325mg and it resolved. He tried it again last month with the same results; resolved with going back to full dose ASA. 12/19:  Doing well and has no complaints. Active w/o limitations. Problem List  1. CAD s/p 1v CABG s/p SVG-RCA @ time of MV repair. cCTA 1/19 w/ patent graft. [place] 2/21 w/ PCI of LAD w/ a ELOY. SVG-RCA patent. PET MPI 3/23:  Impression:  Perfusion Status:  Small zone of basal lateral wall ischemia. Overall a low risk finding. Stress Summary: Nondiagnostic Lexiscan EKG due to baseline changes. LV Function:  The heart size is normal. Basal hypokinesis noted. Separate review of resting gated display reveal Abnormal wall motion with an estimated resting EF of 41%. Separate review of stress gated display reveal Abnormal wall motion with an estimated stress EF of 52%.     2.  Severe MR 2/2 flail leaflet s/p repair 7/14 w/ QUYEN exclusion  3. VT s/p ICD  4. Mixed CM w/ EF 35% now to normal  5. AFLutter s/p DCCV 9/14  6. HTN  7. GERD  8. Mild carotid disease    , no kids, likes bourbon, ret'd  for the state. CARDIAC HISTORY  CAD:  1 CAD [1V CABG, Vein graft to RCA (Dr. Ema Duverney - 7/18/2014     ARRHYTHMIA:  1 SSS, non-sustained VT [ICD, dual chambered, Medtronic] - 9/4/2014   2 A Fib / Flutter [DCCV, 50Jx1 to Junctional rhythm and eventual SR] - 9/3/2014     VALVULAR:  1 Severe mitral valve insufficiency [Mitral valve repair with triangular resection of P2 followed by placement of 34mm CG Future band with annuloplasty. Left atrial appendenge excision,] - 7/18/2014     RISK FACTORS:  1 Dyslipidemia       CARDIOVASCULAR PROCEDURES  Procedure Date Results   Cardiac Calcium Score 06/11/2012 ganesh                  -          34   Cardiac CTA 01/23/2019 RCA graft patent but cannot be well seen proximally due to artifact. Natives not well seen. Cardiac CTA 05/03/2018 Occluded RCA  Patent saphenous vein graft to the distal RCA  No significant blockage in the LAD and circumflex   Cardiac CTA 03/01/2017 The right coronary graft is patent. No critical disease and left coronary circulation. Well-preserved LV systolic function. Normal appearing thoracic aorta   Cardiac PET 02/17/2021 Stress 63%; Rest 54%. Moderate-to-large sized, mild to moderate severity ischemia in the basal lateral wall. Carotid Duplex 06/21/2018 Right: 1-49% proximal ICA stenosis with mild hemodynamic significance. The vertebral artery waveforms are antegrade. Left: 1-49% proximal ICA stenosis with mild hemodynamic significance. The vertebral artery waveforms are antegrade.    Carotid Duplex 07/16/2014 <50% stenosis bilaterally (done at [facility])   Cath 02/23/2021 LM:  ok  LAD:  m50%  LCx:  ok  RCA:  ok  SVG-RCA:  ok   Cath 07/16/2014 No critical left coronary stenosis, moderate or greater right coronary stenosis, Severe mitral regurg, portion of the mitral valve can be visibly seen to prolapse into the left atrium. Patent renal arteries, no aortic valve gradient. CV Surgery 07/18/2014 Mitral valve repair with triangular resection of P2 followed by placement of 34mm CG Future band with annuloplasty. Left atrial appendenge excision, CABG x1   DCCV 09/03/2014 Initial Rhythm A Flutter, Final Rhythm Sinus, Max Joules 50, 1 Shock   Devices 09/04/2014 Dual Chamber ICD (Medtronic)   Echo 11/03/2014 EF: .50, No pericardial effusion. Dual-chamber pacing device visible and right heart chambers. Evidence for the previous mitral valve repair is also noted. Mild residual mitral regurgitation no mitral stenosis LV systolic function has improved low-normal range ejection fraction now of about 50% mild LVH. Echo 09/03/2014 EF 0.35 (35%), Mild LVH, left atrium was dilated, systolic function moderately to severely reduced. (done at [facility])   Echo 03/08/2008 EF 0.65 (65%), Prolapse of both the anterior and posterior mitral valve   leaflets into the left atrial cavity with associated moderate mitral   regurgitation. EKG 02/15/2023 Paced Rhythm   MPI 03/03/2008 EF 0.54 (54%), Normal   PCI 02/23/2021 LAD:  IFR0. 84.  2.75 x 18mm [place] ELOY. [facility] 07/10/2017 EF 0.35 (35%), Moderate endurance without angina but with production of recurrent episodes of ventricular tachycardia. No definite ischemia demonstrated by EKG. Moderate depression of resting EF. [facility] 07/25/2016 EF 0.35 (35%),  Good overall endurance without anginal or significant rhythm disturbances. Peak heart rate about 1:30 on beta blocker. No definite superimposed ischemia. Low-normal EF. Satisfactory result of mitral valve repair   [facility] 07/07/2015 EF 0.40 (40%), Good endurance. No angina. No definite superimposed ischemia. [facility] 07/16/2014 EF 0.55 (55%),  Relatively poor exercise endurance with concerning chest pain and markedly abnormal rhythm response. Cannot exclude ischemia. Severe mitral regurgitation. SARAH BETH 2014 EF 0.50 (50%), No pericardial effusion, Severe mitral valve degeneration with partial posterior leaflet and severe mitral regurg       ACTIVE ALLERGIES:  Ingredient Reaction (Severity) Comment   BACITRACIN     PENICILLINS         PROBLEM LIST:  Problem Description Chronic   Valvular heart disease Y   HTN Y   Hyperlipidemia Y   AFIB Y   CAD Y       PAST MEDICAL/SURGICAL HISTORY  (Detailed)    Disease/disorder Onset Date Surgical History Date Comments     Appendectomy       Right foot surgery       ICD insertion       CABG       Mitral valve repair       Cardiac Pacemaker       Hernia Repair     Atrial fibrillation       BPH       Cardiovascular Disease       Coronary artery disease       GERD       Heart Murmur       High cholesterol       Hypercholesterolemia       Pulmonary Valve Disease       Sick sinus node syndrome       Valvular disease         Family History  (Detailed)  Relationship Family Member Name  Age at Death Condition Onset Age Cause of Death   Brother    Valvular heart disease  N   Brother    High Blood Pressure  N   Father    Sudden Death  N   Father    Stroke  N   Father    Hypertension  N   Father    High Blood Pressure  N   Mother    Heart Attack  N     SOCIAL HISTORY  (Detailed)  Tobacco use reviewed. Preferred language is [place]. EDUCATION/EMPLOYMENT/OCCUPATION  Employment History Status Retired Restrictions                          MARITAL STATUS/FAMILY/SOCIAL SUPPORT  Currently . Smoking status: Former smoker. SMOKING STATUS  Use Status Type Smoking Status Usage Per Day Years Used Total Pack Years   yes  Former smoker            ALCOHOL  There is a history of alcohol use. consumed occasionally. CAFFEINE  The patient uses caffeine. LIFESTYLE  Exercises occasionally. REVIEW OF SYMPTOMS:    CONST - Negative for weight gain, weight loss, fever. EYES - Negative for visual changes.   ENT - Negative for hearing loss.  RESP - Negative for snoring, hemoptysis, dyspnea. CARD - Negative for chest pain, diaphoresis, orthopnea, palpitation, syncope, PND.  VASC - Negative for claudication, edema. GI - Negative for nausea, reflux, bleeding.  - Negative for hematuria, nocturia. REPROD - Negative for erectile dysfunction. ENDO - Negative for goiter, tremors. NEURO - Negative for dizziness, memory loss, seizures. PSYCH - Negative for depression, hallucinations. DERM - Negative for rash, skin sores. M/S - Negative for joint pain, myalgia. HEMAT - Negative for acute anemia, thrombocytopenia. VITAL SIGNS  Time BP mm/Hg Pulse /min Resp /min Temp F Ht ft Ht in Ht cm Wt lb Wt kg BMI kg/m2 BSA m2 O2 Sat%   10:41 /72 69   5.0 9.00 175.26 147.00 66.678 21.71         PHYSICAL EXAM:  Exam Findings Details   Const Neg Level of Distress - Awake / Alert. Nourishment - Well Nourished. Appearance - Well Developed. Eyes Neg Lids/External - Bilateral Normal.  Conjunctiva - Bilateral Normal.   NMT Neg Oral Mucosa - Moist, No Cyanosis, No Pallor. Neck Neg JVP - Less Than 8. Resp Neg Respirations - Nonlabored. Breath Sounds - Clear Throughout. Rales - Absent. Wheezes - Absent. Rhonchi - Absent. Cardiac Neg Rhythm - Regular. Palpation - PMI Normal.  Heart Sounds - S1 Normal, S2 Normal, No S3, No S4. Extra Sounds - None. Murmurs - None. M/S Neg Gait - Normal.  Able to Exercise - Yes. EXT Neg Clubbing - Absent. Lower Extremity Edema - Absent. Skin Neg Venous Stasis Ulcer - Absent. Psych Neg Orientation - Oriented to Time, Person, Place. IMPRESSION AND PLAN  01. Atherosclerotic heart disease of native coronary artery with other forms of angina pectoris (I25.118):  Continue DAPT. He wants to stay on 325mg ASA. PET MPI to be done first avail. ECG done   02. Ventricular fibrillation (I49.01):  No recurrence with increased Amiodarone dosing. The patient has an ICD in place.    03. Cardiomyopathy, unspecified (I42.9):  Recent EF near normal.  No congestive heart failure manifestations. 04. Presence of automatic (implantable) cardiac defibrillator (Z95.810): He will continue to be followed in device clinic. The incision at the device implantation site is well healed with no evidence of bleeding or infection. There have been no ICD discharges. Most recent interrogation showed normal function. Settings adjusted to optimize function and battery life. This is a dual chamber device. 05. Encounter for adjustment and management of automatic implantable cardiac defibrillator (Z45.02):  Continue office checks. 06. Presence of aortocoronary bypass graft (Z95.1): This condition is stable. 07. Paroxysmal atrial fibrillation (I48.0):  Low burden by device check. 08. Sick sinus syndrome (I49.5): This condition is stable. The patient has a permanent pacemaker/ICD. 09. Mixed hyperlipidemia (E78.2): The patient is tolerating medical therapy. 11. Long term (current) use of aspirin (Z79.82)   11. Personal history of nicotine dependence (Z87.891)   12. Body mass index [BMI] 21.0-21.9, adult (E81.69)     ORDERS:  1 ECG done    2 Positron Emission Tomography/Computed Tomography/Lexiscan protocol at the first available time. 3 Return office visit with Jie Bearden NP in 6 Months.         FINAL MEDICATION LIST  Medication Sig Desc Non-VCS   AMIODARONE 200MG TABLETS TAKE 1 TABLET BY MOUTH EVERY DAY N   ammonium lactate 12 % topical cream as directed N   aspirin 325 mg tablet take 1 tablet by oral route  every day N   atorvastatin 40 mg tablet take 1 tablet by oral route  every day Y   Coreg 3.125 mg tablet take 1 tablet by oral route 2 times every day with food N   ezetimibe 10 mg tablet take 1 tablet by oral route  every day N   finasteride 5 mg tablet take 1 tablet by oral route  every day Y   Levoxyl 75 mcg tablet take 1 tablet by oral route  every day Y   multivitamin tablet take 1 tablet by oral route  every day with food N   Plavix 75 mg tablet TAKE 1 TABLET BY MOUTH EVERY DAY N   PreserVision AREDS 2 250 mg-200 unit-40 mg-1 mg capsule as directed N

## 2023-03-28 NOTE — PROGRESS NOTES
1100: I have reviewed discharge instructions with the patient and niece. The patient and niece verbalized understanding. Informed patient/niece to call Dr. Milton Quevedo office to schedule a follow-up appointment. 1400: Ambulated in the bathroom without difficulty, Right radial incision CDI no bleeding no hematoma. Patient denies any complaints. 1415: Patient discharged removed PIV, escorted to car via wheelchair accompanied by niece.

## 2023-03-28 NOTE — PROGRESS NOTES
Brief Procedure Note    Patient: Stefano Bowers MRN: [id number]  SSN: [id number]    YOB: 1945  Age: 66 y.o. Sex: male      Date of Procedure: 3/28/2023     Pre-procedure Diagnosis: Abn nuc    Post-procedure Diagnosis: 1v CAD, elevated LVEDP, patent SVG-RCA, patent LAD stent    Procedure: Ultrasound-guided vascular access, LHC, cors, selective bypass angio    Performed By: Mendy Barrios III, DO     Anesthesia: Moderate Sedation    Estimated Blood Loss: Less than 10 mL      Specimens:  None    Findings: as above    Complications: None    Implants: None    Recommendations: Continue medical therapy.     Signed By: Murray Stokes DO     March 28, 2023

## 2023-03-28 NOTE — DISCHARGE INSTR - DIET
[address] [facility], [address]   [phone]  [address]    [email or web address]    Patient Discharge Instructions    García Valle / [id number] : 1945    Admitted 3/28/2023 Discharged: 3/28/2023       It is important that you take the medication exactly as they are prescribed. Keep your medication in the bottles provided by the pharmacist and keep a list of the medication names, dosages, and times to be taken in your wallet. Do not take other medications without consulting your doctor. BRING ALL OF YOUR MEDICINES TO YOUR OFFICE VISIT with my nurse practitioner, Moises Garrison. .    Follow-up with my nurse practitioner, Moises Garrison in 2-3 weeks. Cardiac Catheterization  Discharge Instructions    Transradial Catheterization Discharge Instructions (WRIST)    Discharge instructions: Your radial artery in your wrist was used for your cardiac catheterization. This site may be slightly bruised and sore following your procedure. Expect mild tingling or the hand and tenderness at the puncture site for up to 3 days. Excess movement of the wrist used should be avoided for the next 24-48 hours. No lifting over 2 pounds (approximately a ½ gallon of milk) with this arm for 24 hours. Keep the site of the procedure covered with a bandage for 24 hours. You may shower the day after your procedure. Do not take a tub bath or submerge the puncture site in water for 48 hours. No heavy impact activity/lifting > 30 pounds for 1 week. If bleeding of the wrist occurs at home:   If the site on your wrist where you had the catheterization procedure begins to bleed, do not panic. Place 1 or 2 fingers over the puncture site and hold pressure to stop the bleeding. You may be able to feel your pulse as you hold pressure. Lift your fingers after 5 minutes to see if the bleeding has stopped. Once the bleeding has stopped, gently wipe the wrist area clean and cover with a bandage. If the bleeding from your wrist does not stop after 15 minutes, or if there is a large amount of bleeding or spurting, call 911 immediately (do not drive yourself to the hospital). Other concerns: The site may be slightly bruised and sore following your procedure. Should any of the following occur, contact your physician immediately:   Any cool or coldness of the arm, discoloration over a large area, ongoing numbness or any abnormal sensations , moderate to severe pain or swelling in the arm. Redness, soreness, swelling, chills or fever, or colored drainage at the procedure site within 3-7 days after your procedure. If you have any further questions or concerns regarding your procedure please call the Cardiac Cath Lab office at [phone]. During regular business hours ask to speak to Dr. Lizzette Malone. During non-business hours the answering service will answer. Ask to speak to the physician on call for [facility].     Transfemoral Catheterization Discharge Instructions (GROIN)    Do not drive, operate any machinery, or sign any legal documents for 24 hours after your procedure. You must have someone to drive you home. You may take a shower 24 hours after your cardiac catheterization. Be sure to get the dressing wet and then remove it; gently wash the area with warm soapy water. Pat dry and leave open to air. To help prevent infections, be sure to keep the cath site clean and dry. No lotions, creams, powders, ointments, etc. in the cath site for approximately 1 week. Do not take a tub bath, get in a hot tub or swimming pool for approximately 5 days or until the cath site is completely healed. No strenuous activity or heavy lifting over 10 lbs. for 7 days. Drink plenty of fluids for 24-48 hours after your cath to flush the contrast dye from your kidneys. No alcoholic beverages for 24 hours.   You may resume your previous diet (low fat, low cholesterol) after your cath. After your cath, some bruising or discomfort is common during the healing process. Tylenol, 1-2 tablets every 6 hours as needed, is recommended if you experience any discomfort. If you experience any signs or symptoms of infection such as fever, chills, or poorly healing incision, persistent tenderness or swelling in the groin, redness and/or warmth to the touch, numbness, significant tingling or pain at the groin site or affected extremity, rash, drainage from the cath site, or if the leg feels tight or swollen, call your physician right away. If bleeding at the cath site occurs, take a clean gauze pad and apply direct pressure to the groin just above the puncture site. Call 911 immediately, and continue to apply direct pressure until an ambulance gets to your location. You may return to work  2  days after your cardiac cath if no groin bleeding. Information obtained by :  I understand that if any problems occur once I am at home I am to contact my physician. I understand and acknowledge receipt of the instructions indicated above. R.N.'s Signature                                                                  Date/Time                                                                                                                                              Patient or Representative Signature                                                          Date/Time      Max Sharpe III, DO             [address]    [phone]  [place] [address] [address] [place]    [email or web address]

## 2023-03-29 NOTE — CARDIO/PULMONARY
Cardiopulmonary Rehab:    Chart reviewed. Pt is a 66 y.o. M admitted with Chest pain, unspecified type [R07.9]. Smoking history assessed. Patient is a non smoker. No updated EF on chart. S/p cardiac cath on 3/28/23. Per Dr. Matty Kate cath report: \"Single vessel CAD\". Cardiac rehab is not indicated at this time.

## 2023-04-26 RX ORDER — LEVOTHYROXINE SODIUM 75 UG/1
75 TABLET ORAL
Qty: 90 TABLET | Refills: 3 | Status: SHIPPED | OUTPATIENT
Start: 2023-04-26

## 2023-04-26 NOTE — TELEPHONE ENCOUNTER
PCP: Miguel A Coburn MD    Last appt: 11/8/2022  Future Appointments   Date Time Provider Robert Camejo   6/8/2023 10:30 AM Miguel A Coburn MD [facility] BS AMB       Requested Prescriptions     Pending Prescriptions Disp Refills    levothyroxine (SYNTHROID) 75 mcg tablet 90 Tablet 3     Sig: Take 1 Tablet by mouth Daily (before breakfast).

## 2023-05-19 RX ORDER — FINASTERIDE 5 MG/1
1 TABLET, FILM COATED ORAL DAILY
COMMUNITY
Start: 2020-06-18

## 2023-05-19 RX ORDER — AMOXICILLIN 500 MG/1
500 CAPSULE ORAL DAILY PRN
COMMUNITY

## 2023-05-19 RX ORDER — LEVOTHYROXINE SODIUM 0.07 MG/1
75 TABLET ORAL
COMMUNITY
Start: 2023-04-26

## 2023-05-19 RX ORDER — EZETIMIBE 10 MG/1
10 TABLET ORAL DAILY
COMMUNITY
Start: 2022-10-03

## 2023-05-19 RX ORDER — CLOPIDOGREL BISULFATE 75 MG/1
75 TABLET ORAL DAILY
COMMUNITY
Start: 2021-02-24

## 2023-05-19 RX ORDER — AMMONIUM LACTATE 12 G/100G
LOTION TOPICAL PRN
COMMUNITY

## 2023-05-19 RX ORDER — AMIODARONE HYDROCHLORIDE 200 MG/1
200 TABLET ORAL DAILY
COMMUNITY
Start: 2020-08-18

## 2023-05-19 RX ORDER — CARVEDILOL 3.12 MG/1
3.12 TABLET ORAL
COMMUNITY
Start: 2020-06-22

## 2023-05-19 RX ORDER — ATORVASTATIN CALCIUM 40 MG/1
1 TABLET, FILM COATED ORAL DAILY
COMMUNITY
Start: 2023-04-13

## 2023-06-01 ENCOUNTER — TELEPHONE (OUTPATIENT)
Age: 78
End: 2023-06-01

## 2023-06-02 ENCOUNTER — TELEPHONE (OUTPATIENT)
Age: 78
End: 2023-06-02

## 2023-06-02 DIAGNOSIS — I25.10 CORONARY ARTERY DISEASE INVOLVING NATIVE CORONARY ARTERY OF NATIVE HEART WITHOUT ANGINA PECTORIS: Primary | ICD-10-CM

## 2023-06-02 DIAGNOSIS — I50.9 CONGESTIVE HEART FAILURE, UNSPECIFIED HF CHRONICITY, UNSPECIFIED HEART FAILURE TYPE (HCC): ICD-10-CM

## 2023-06-02 DIAGNOSIS — E03.9 HYPOTHYROIDISM, UNSPECIFIED TYPE: ICD-10-CM

## 2023-06-02 DIAGNOSIS — E78.00 PURE HYPERCHOLESTEROLEMIA: ICD-10-CM

## 2023-06-05 ENCOUNTER — TELEPHONE (OUTPATIENT)
Age: 78
End: 2023-06-05

## 2023-06-05 NOTE — TELEPHONE ENCOUNTER
Spoke with patient and advised lab orders have been faxed to [facility] in [place] [id number] as requested. Faxed confirmed.

## 2023-06-06 LAB
BASOPHILS # BLD AUTO: 0 X10E3/UL (ref 0–0.2)
BASOPHILS NFR BLD AUTO: 0 %
EOSINOPHIL # BLD AUTO: 0.1 X10E3/UL (ref 0–0.4)
EOSINOPHIL NFR BLD AUTO: 2 %
ERYTHROCYTE [DISTWIDTH] IN BLOOD BY AUTOMATED COUNT: 12.6 % (ref 11.6–15.4)
HCT VFR BLD AUTO: 42.9 % (ref 37.5–51)
HGB BLD-MCNC: 14.1 G/DL (ref 13–17.7)
IMM GRANULOCYTES # BLD AUTO: 0 X10E3/UL (ref 0–0.1)
IMM GRANULOCYTES NFR BLD AUTO: 0 %
LYMPHOCYTES # BLD AUTO: 0.9 X10E3/UL (ref 0.7–3.1)
LYMPHOCYTES NFR BLD AUTO: 19 %
MCH RBC QN AUTO: 30.3 PG (ref 26.6–33)
MCHC RBC AUTO-ENTMCNC: 32.9 G/DL (ref 31.5–35.7)
MCV RBC AUTO: 92 FL (ref 79–97)
MONOCYTES # BLD AUTO: 0.6 X10E3/UL (ref 0.1–0.9)
MONOCYTES NFR BLD AUTO: 12 %
NEUTROPHILS # BLD AUTO: 3.1 X10E3/UL (ref 1.4–7)
NEUTROPHILS NFR BLD AUTO: 67 %
PLATELET # BLD AUTO: 159 X10E3/UL (ref 150–450)
RBC # BLD AUTO: 4.65 X10E6/UL (ref 4.14–5.8)
SPECIMEN STATUS REPORT: NORMAL
WBC # BLD AUTO: 4.6 X10E3/UL (ref 3.4–10.8)

## 2023-06-07 LAB
ALBUMIN SERPL-MCNC: 3.9 G/DL (ref 3.7–4.7)
ALBUMIN/GLOB SERPL: 1.6 {RATIO} (ref 1.2–2.2)
ALP SERPL-CCNC: 62 IU/L (ref 44–121)
ALT SERPL-CCNC: 39 IU/L (ref 0–44)
AST SERPL-CCNC: 35 IU/L (ref 0–40)
BILIRUB SERPL-MCNC: 0.8 MG/DL (ref 0–1.2)
BUN SERPL-MCNC: 14 MG/DL (ref 8–27)
BUN/CREAT SERPL: 11 (ref 10–24)
CALCIUM SERPL-MCNC: 8.8 MG/DL (ref 8.6–10.2)
CHLORIDE SERPL-SCNC: 104 MMOL/L (ref 96–106)
CHOLEST SERPL-MCNC: 133 MG/DL (ref 100–199)
CO2 SERPL-SCNC: 27 MMOL/L (ref 20–29)
CREAT SERPL-MCNC: 1.25 MG/DL (ref 0.76–1.27)
EGFRCR SERPLBLD CKD-EPI 2021: 59 ML/MIN/1.73
GLOBULIN SER CALC-MCNC: 2.5 G/DL (ref 1.5–4.5)
GLUCOSE SERPL-MCNC: 93 MG/DL (ref 70–99)
HDLC SERPL-MCNC: 69 MG/DL
LDLC SERPL CALC-MCNC: 51 MG/DL (ref 0–99)
POTASSIUM SERPL-SCNC: 4.3 MMOL/L (ref 3.5–5.2)
PROT SERPL-MCNC: 6.4 G/DL (ref 6–8.5)
SODIUM SERPL-SCNC: 141 MMOL/L (ref 134–144)
TRIGL SERPL-MCNC: 59 MG/DL (ref 0–149)
TSH SERPL DL<=0.005 MIU/L-ACNC: 1.18 UIU/ML (ref 0.45–4.5)
VLDLC SERPL CALC-MCNC: 13 MG/DL (ref 5–40)

## 2023-06-08 ENCOUNTER — OFFICE VISIT (OUTPATIENT)
Age: 78
End: 2023-06-08
Payer: MEDICARE

## 2023-06-08 VITALS
SYSTOLIC BLOOD PRESSURE: 118 MMHG | DIASTOLIC BLOOD PRESSURE: 72 MMHG | HEART RATE: 73 BPM | TEMPERATURE: 97 F | HEIGHT: 68 IN | RESPIRATION RATE: 16 BRPM | WEIGHT: 150.8 LBS | OXYGEN SATURATION: 97 % | BODY MASS INDEX: 22.85 KG/M2

## 2023-06-08 DIAGNOSIS — I50.22 CHRONIC SYSTOLIC (CONGESTIVE) HEART FAILURE (HCC): ICD-10-CM

## 2023-06-08 DIAGNOSIS — E03.9 HYPOTHYROIDISM, UNSPECIFIED TYPE: ICD-10-CM

## 2023-06-08 DIAGNOSIS — I25.10 CORONARY ARTERY DISEASE INVOLVING NATIVE CORONARY ARTERY OF NATIVE HEART WITHOUT ANGINA PECTORIS: ICD-10-CM

## 2023-06-08 DIAGNOSIS — Z11.1 TUBERCULOSIS SCREENING: Primary | ICD-10-CM

## 2023-06-08 DIAGNOSIS — E78.5 HYPERLIPIDEMIA, UNSPECIFIED HYPERLIPIDEMIA TYPE: ICD-10-CM

## 2023-06-08 PROCEDURE — 1123F ACP DISCUSS/DSCN MKR DOCD: CPT | Performed by: INTERNAL MEDICINE

## 2023-06-08 PROCEDURE — G0439 PPPS, SUBSEQ VISIT: HCPCS | Performed by: INTERNAL MEDICINE

## 2023-06-08 RX ORDER — ASPIRIN 325 MG
1 TABLET ORAL DAILY
COMMUNITY
Start: 2013-01-10 | End: 2023-06-08

## 2023-06-08 SDOH — ECONOMIC STABILITY: FOOD INSECURITY: WITHIN THE PAST 12 MONTHS, THE FOOD YOU BOUGHT JUST DIDN'T LAST AND YOU DIDN'T HAVE MONEY TO GET MORE.: NEVER TRUE

## 2023-06-08 SDOH — ECONOMIC STABILITY: INCOME INSECURITY: HOW HARD IS IT FOR YOU TO PAY FOR THE VERY BASICS LIKE FOOD, HOUSING, MEDICAL CARE, AND HEATING?: NOT HARD AT ALL

## 2023-06-08 SDOH — ECONOMIC STABILITY: FOOD INSECURITY: WITHIN THE PAST 12 MONTHS, YOU WORRIED THAT YOUR FOOD WOULD RUN OUT BEFORE YOU GOT MONEY TO BUY MORE.: NEVER TRUE

## 2023-06-08 SDOH — ECONOMIC STABILITY: HOUSING INSECURITY
IN THE LAST 12 MONTHS, WAS THERE A TIME WHEN YOU DID NOT HAVE A STEADY PLACE TO SLEEP OR SLEPT IN A SHELTER (INCLUDING NOW)?: NO

## 2023-06-08 ASSESSMENT — LIFESTYLE VARIABLES
HOW OFTEN DURING THE LAST YEAR HAVE YOU FAILED TO DO WHAT WAS NORMALLY EXPECTED FROM YOU BECAUSE OF DRINKING: 0
HOW OFTEN DURING THE LAST YEAR HAVE YOU NEEDED AN ALCOHOLIC DRINK FIRST THING IN THE MORNING TO GET YOURSELF GOING AFTER A NIGHT OF HEAVY DRINKING: 0
HOW OFTEN DO YOU HAVE A DRINK CONTAINING ALCOHOL: 2-3 TIMES A WEEK
HOW MANY STANDARD DRINKS CONTAINING ALCOHOL DO YOU HAVE ON A TYPICAL DAY: 3 OR 4
HOW MANY STANDARD DRINKS CONTAINING ALCOHOL DO YOU HAVE ON A TYPICAL DAY: 3 OR 4
HOW OFTEN DURING THE LAST YEAR HAVE YOU FOUND THAT YOU WERE NOT ABLE TO STOP DRINKING ONCE YOU HAD STARTED: 0
HAVE YOU OR SOMEONE ELSE BEEN INJURED AS A RESULT OF YOUR DRINKING: 0
HOW OFTEN DO YOU HAVE A DRINK CONTAINING ALCOHOL: 2-3 TIMES A WEEK
HAS A RELATIVE, FRIEND, DOCTOR, OR ANOTHER HEALTH PROFESSIONAL EXPRESSED CONCERN ABOUT YOUR DRINKING OR SUGGESTED YOU CUT DOWN: 0
HOW OFTEN DURING THE LAST YEAR HAVE YOU BEEN UNABLE TO REMEMBER WHAT HAPPENED THE NIGHT BEFORE BECAUSE YOU HAD BEEN DRINKING: 0
HOW OFTEN DURING THE LAST YEAR HAVE YOU HAD A FEELING OF GUILT OR REMORSE AFTER DRINKING: 0

## 2023-06-08 ASSESSMENT — PATIENT HEALTH QUESTIONNAIRE - PHQ9
SUM OF ALL RESPONSES TO PHQ9 QUESTIONS 1 & 2: 1
SUM OF ALL RESPONSES TO PHQ QUESTIONS 1-9: 1
SUM OF ALL RESPONSES TO PHQ QUESTIONS 1-9: 1
1. LITTLE INTEREST OR PLEASURE IN DOING THINGS: 1
SUM OF ALL RESPONSES TO PHQ QUESTIONS 1-9: 1
SUM OF ALL RESPONSES TO PHQ QUESTIONS 1-9: 1
2. FEELING DOWN, DEPRESSED OR HOPELESS: 0

## 2023-06-08 NOTE — PROGRESS NOTES
1. \"Have you been to the ER, urgent care clinic since your last visit? Hospitalized since your last visit? \" No    2. \"Have you seen or consulted any other health care providers outside of the [address] [place] [address] since your last visit? \"         Dr. Lore Irizarry // april [facility] // cardio // stress test and heart cath 3/2023    3. For patients aged 39-70: Has the patient had a colonoscopy / FIT/ Cologuard? No      If the patient is female:    4. For patients aged 41-77: Has the patient had a mammogram within the past 2 years? No      5. For patients aged 21-65: Has the patient had a pap smear?   No

## 2023-06-08 NOTE — PROGRESS NOTES
HISTORY OF PRESENT ILLNESS   Edgar Teresa   is a 66 y.o.  male. Fu CAD s/p 1 v cabg  2014  s/p MV repair ,CHF-ef normal now hx V-tach s/p AICD, hx aflutter s/p CV, HLD BPH  hypothyroid and medicare wellness-----    CARD Dr Ackerman Real neg this year and CATH-negative this year  On amiodarone for aflutter   No cp or chf symptoms  URO in [place]  -for PSA and BPH    Had recent labs cbc cmp lipids tsh wnl    Skin cancer dx s/p mohs left nose in January then plastic surgery    Brought forms for [facility] living her in [facility]  Had recent labs cmp wnl LDL 53 tsh 0.969  CHF sxs  Sees Dr Jeremy Jalloh months--s/p stent 2 yrs ago 2021--no cp or angina  On amiodaraonefor hx vfib--tsh and lft ok on recent labs  Says very busy and active  Retired   Nonsmoker, etoh 1-2 shots per day-bourbon but seasonal  Recent  cmp lipids tsh  Sees URO md next monthfor PSA and BPH    Current Outpatient Medications   Medication Sig Dispense Refill    amiodarone (CORDARONE) 200 MG tablet Take 1 tablet by mouth daily      ammonium lactate (LAC-HYDRIN) 12 % lotion Apply topically as needed      amoxicillin (AMOXIL) 500 MG capsule Take 1 capsule by mouth daily as needed      atorvastatin (LIPITOR) 40 MG tablet Take 1 tablet by mouth daily      carvedilol (COREG) 3.125 MG tablet 1 tablet      clopidogrel (PLAVIX) 75 MG tablet Take 1 tablet by mouth daily      ezetimibe (ZETIA) 10 MG tablet Take 1 tablet by mouth daily      finasteride (PROSCAR) 5 MG tablet Take 1 tablet by mouth daily      levothyroxine (SYNTHROID) 75 MCG tablet Take 1 tablet by mouth every morning (before breakfast)       No current facility-administered medications for this visit. Allergies   Allergen Reactions    Penicillin G Swelling    Bacitracin Rash and Swelling     Eyes swelled up to almost closed.          BMP:   Lab Results   Component Value Date/Time     06/06/2023 10:26 AM    K 4.3 06/06/2023 10:26 AM

## 2023-06-20 ENCOUNTER — TELEPHONE (OUTPATIENT)
Age: 78
End: 2023-06-20

## 2023-06-20 NOTE — TELEPHONE ENCOUNTER
Pt requested a re-fax of the [facility] forms as they report missing pages    Psr refaxed all forms as requested.

## 2023-10-25 RX ORDER — EZETIMIBE 10 MG/1
10 TABLET ORAL DAILY
Qty: 30 TABLET | Refills: 5 | Status: SHIPPED | OUTPATIENT
Start: 2023-10-25

## 2023-10-25 NOTE — TELEPHONE ENCOUNTER
PCP: Theresa Conner MD    Last appt: 6/8/2023  Future Appointments   Date Time Provider [address]   12/11/2023 11:00 AM Theresa Conner MD [facility] BS AMB       Requested Prescriptions     Pending Prescriptions Disp Refills    ezetimibe (ZETIA) 10 MG tablet 30 tablet 5     Sig: Take 1 tablet by mouth daily

## 2023-10-25 NOTE — TELEPHONE ENCOUNTER
ezetimibe (ZETIA) 10 MG tablet    Refill  [facility] #[phone]      Pt has been waiting for pharm to get from 10-20-23.

## 2023-12-04 ENCOUNTER — TELEPHONE (OUTPATIENT)
Age: 78
End: 2023-12-04

## 2023-12-04 DIAGNOSIS — E78.5 HYPERLIPIDEMIA, UNSPECIFIED HYPERLIPIDEMIA TYPE: ICD-10-CM

## 2023-12-04 DIAGNOSIS — I25.10 CORONARY ARTERY DISEASE INVOLVING NATIVE CORONARY ARTERY OF NATIVE HEART WITHOUT ANGINA PECTORIS: Primary | ICD-10-CM

## 2023-12-04 DIAGNOSIS — E03.9 HYPOTHYROIDISM, UNSPECIFIED TYPE: ICD-10-CM

## 2023-12-04 NOTE — TELEPHONE ENCOUNTER
Patient wants lab orders for appointment next week faxed to [facility] in [address]. Please call patient once faxed so he can go for a lab draw. Thanks.

## 2023-12-04 NOTE — TELEPHONE ENCOUNTER
Spoke with patient. Advised labs has been faxed to [address] [place] [address] in hospitals. Verbalized understanding. Fax confirmed.

## 2023-12-06 LAB
ERYTHROCYTE [DISTWIDTH] IN BLOOD BY AUTOMATED COUNT: 12.5 % (ref 11.6–15.4)
HCT VFR BLD AUTO: 44.7 % (ref 37.5–51)
HGB BLD-MCNC: 14.2 G/DL (ref 13–17.7)
MCH RBC QN AUTO: 29.8 PG (ref 26.6–33)
MCHC RBC AUTO-ENTMCNC: 31.8 G/DL (ref 31.5–35.7)
MCV RBC AUTO: 94 FL (ref 79–97)
PLATELET # BLD AUTO: 189 X10E3/UL (ref 150–450)
RBC # BLD AUTO: 4.76 X10E6/UL (ref 4.14–5.8)
WBC # BLD AUTO: 5.8 X10E3/UL (ref 3.4–10.8)

## 2023-12-07 LAB
ALBUMIN SERPL-MCNC: 4.2 G/DL (ref 3.8–4.8)
ALBUMIN/GLOB SERPL: 1.7 {RATIO} (ref 1.2–2.2)
ALP SERPL-CCNC: 54 IU/L (ref 44–121)
ALT SERPL-CCNC: 29 IU/L (ref 0–44)
AST SERPL-CCNC: 31 IU/L (ref 0–40)
BILIRUB SERPL-MCNC: 0.9 MG/DL (ref 0–1.2)
BUN SERPL-MCNC: 16 MG/DL (ref 8–27)
BUN/CREAT SERPL: 13 (ref 10–24)
CALCIUM SERPL-MCNC: 9 MG/DL (ref 8.6–10.2)
CHLORIDE SERPL-SCNC: 106 MMOL/L (ref 96–106)
CHOLEST SERPL-MCNC: 151 MG/DL (ref 100–199)
CO2 SERPL-SCNC: 25 MMOL/L (ref 20–29)
CREAT SERPL-MCNC: 1.19 MG/DL (ref 0.76–1.27)
EGFRCR SERPLBLD CKD-EPI 2021: 63 ML/MIN/1.73
GLOBULIN SER CALC-MCNC: 2.5 G/DL (ref 1.5–4.5)
GLUCOSE SERPL-MCNC: 94 MG/DL (ref 70–99)
HDLC SERPL-MCNC: 77 MG/DL
LDLC SERPL CALC-MCNC: 62 MG/DL (ref 0–99)
POTASSIUM SERPL-SCNC: 4.4 MMOL/L (ref 3.5–5.2)
PROT SERPL-MCNC: 6.7 G/DL (ref 6–8.5)
SODIUM SERPL-SCNC: 146 MMOL/L (ref 134–144)
TRIGL SERPL-MCNC: 56 MG/DL (ref 0–149)
TSH SERPL DL<=0.005 MIU/L-ACNC: 2.06 UIU/ML (ref 0.45–4.5)
VLDLC SERPL CALC-MCNC: 12 MG/DL (ref 5–40)

## 2023-12-11 ENCOUNTER — OFFICE VISIT (OUTPATIENT)
Age: 78
End: 2023-12-11
Payer: MEDICARE

## 2023-12-11 VITALS
HEART RATE: 77 BPM | WEIGHT: 153.6 LBS | HEIGHT: 68 IN | RESPIRATION RATE: 16 BRPM | OXYGEN SATURATION: 97 % | DIASTOLIC BLOOD PRESSURE: 76 MMHG | TEMPERATURE: 97.1 F | BODY MASS INDEX: 23.28 KG/M2 | SYSTOLIC BLOOD PRESSURE: 110 MMHG

## 2023-12-11 DIAGNOSIS — Z86.79 HX OF ATRIAL FLUTTER: ICD-10-CM

## 2023-12-11 DIAGNOSIS — E03.9 HYPOTHYROIDISM, UNSPECIFIED TYPE: ICD-10-CM

## 2023-12-11 DIAGNOSIS — E78.5 HYPERLIPIDEMIA, UNSPECIFIED HYPERLIPIDEMIA TYPE: ICD-10-CM

## 2023-12-11 DIAGNOSIS — Z86.79 HX OF VENTRICULAR TACHYCARDIA: ICD-10-CM

## 2023-12-11 DIAGNOSIS — I25.10 CORONARY ARTERY DISEASE INVOLVING NATIVE CORONARY ARTERY OF NATIVE HEART WITHOUT ANGINA PECTORIS: Primary | ICD-10-CM

## 2023-12-11 PROCEDURE — G8484 FLU IMMUNIZE NO ADMIN: HCPCS | Performed by: INTERNAL MEDICINE

## 2023-12-11 PROCEDURE — 1123F ACP DISCUSS/DSCN MKR DOCD: CPT | Performed by: INTERNAL MEDICINE

## 2023-12-11 PROCEDURE — 99214 OFFICE O/P EST MOD 30 MIN: CPT | Performed by: INTERNAL MEDICINE

## 2023-12-11 PROCEDURE — 4004F PT TOBACCO SCREEN RCVD TLK: CPT | Performed by: INTERNAL MEDICINE

## 2023-12-11 PROCEDURE — G8420 CALC BMI NORM PARAMETERS: HCPCS | Performed by: INTERNAL MEDICINE

## 2023-12-11 PROCEDURE — G8427 DOCREV CUR MEDS BY ELIG CLIN: HCPCS | Performed by: INTERNAL MEDICINE

## 2023-12-11 NOTE — PROGRESS NOTES
1. \"Have you been to the ER, urgent care clinic since your last visit? Hospitalized since your last visit? \" No    2. \"Have you seen or consulted any other health care providers outside of the [address] [place] [address] since your last visit? \"         Urology     3. For patients aged 43-73: Has the patient had a colonoscopy / FIT/ Cologuard?  No
MG tablet 1 tablet      clopidogrel (PLAVIX) 75 MG tablet Take 1 tablet by mouth daily      finasteride (PROSCAR) 5 MG tablet Take 1 tablet by mouth daily      levothyroxine (SYNTHROID) 75 MCG tablet Take 1 tablet by mouth every morning (before breakfast)       No current facility-administered medications for this visit. Allergies   Allergen Reactions    Penicillin G Swelling    Bacitracin Rash and Swelling     Eyes swelled up to almost closed. BMP:   Lab Results   Component Value Date/Time     12/06/2023 10:57 AM    K 4.4 12/06/2023 10:57 AM     12/06/2023 10:57 AM    CO2 25 12/06/2023 10:57 AM    BUN 16 12/06/2023 10:57 AM    CREATININE 1.19 12/06/2023 10:57 AM    GLUCOSE 94 12/06/2023 10:57 AM    CALCIUM 9.0 12/06/2023 10:57 AM      CBC:   Lab Results   Component Value Date/Time    WBC 5.8 12/06/2023 10:57 AM    RBC 4.76 12/06/2023 10:57 AM    HGB 14.2 12/06/2023 10:57 AM    HCT 44.7 12/06/2023 10:57 AM    MCV 94 12/06/2023 10:57 AM    MCH 29.8 12/06/2023 10:57 AM    MCHC 31.8 12/06/2023 10:57 AM    RDW 12.5 12/06/2023 10:57 AM     12/06/2023 10:57 AM    MPV 11.4 02/24/2021 05:30 AM      Lipids   Lab Results   Component Value Date/Time    CHOL 151 12/06/2023 10:57 AM    CHOL 128 11/04/2022 10:23 AM    TRIG 56 12/06/2023 10:57 AM    HDL 77 12/06/2023 10:57 AM    LDLCALC 62 12/06/2023 10:57 AM    LABVLDL 12 12/06/2023 10:57 AM     Hemoglobin A1C: No results found for: \"LABA1C\", \"[id number]\"     Review of Systems     Physical Exam  Constitutional:       Appearance: Normal appearance. HENT:      Head: Normocephalic and atraumatic. Right Ear: Tympanic membrane normal.      Left Ear: Tympanic membrane normal.      Nose: Nose normal.      Mouth/Throat:      Mouth: Mucous membranes are moist.   Eyes:      Pupils: Pupils are equal, round, and reactive to light. Cardiovascular:      Rate and Rhythm: Normal rate and regular rhythm. Pulses: Normal pulses.       Heart sounds: Normal

## 2023-12-15 ENCOUNTER — TELEPHONE (OUTPATIENT)
Age: 78
End: 2023-12-15

## 2023-12-15 NOTE — TELEPHONE ENCOUNTER
Reyna//[facility] states she needs a call back to get Verbal Orders for patient to received a Personal Caregiver//Skilled Nursing starting on 12/21/23 as patient is wanting personal care at his Home Unit & not at Medical unit on site from his Defibrillator/Pacemake replacement Surgery on 12/20/23. Office notes will also be needed to be faxed along with Orders. Please  call to discuss & advise.  Thank you      Fax# is [phone]

## 2023-12-15 NOTE — TELEPHONE ENCOUNTER
Spoke with Reyna from [facility]. Verbal ok given. Last office notes fax confirmed.

## 2024-01-04 ENCOUNTER — TELEPHONE (OUTPATIENT)
Age: 79
End: 2024-01-04

## 2024-01-04 NOTE — TELEPHONE ENCOUNTER
Kamini from home health is requesting a call back regarding Dr. Anaya following patient for home health. Patient is having a precedure for a pacemaker sooner then expected and they would like to make sure Dr. Anaya will still follow for home health. Please call Kamini,   [phone], to advise.

## 2024-01-05 ENCOUNTER — TELEPHONE (OUTPATIENT)
Age: 79
End: 2024-01-05

## 2024-01-05 NOTE — TELEPHONE ENCOUNTER
TAYLOR  Spoke with Soraya// [facility]. Verbal given that PCP will follow patient for skilled nursing, no PT/OT post cardiac defibrillator replacement.     Original verbal was given on 12/15/2023 with a surgery date of 2023 and start of care on 2023. Patient changed surgery date to 2024 which makes previous verbal  and new start of care 2024.    No further assistance needed.

## 2024-01-05 NOTE — TELEPHONE ENCOUNTER
Soraya//[facility] states she needs to get a New/Updated Skilled Nursing Order as former order has  & needs for Tomorrow that patient is being seen Post Procedure for Defibrillator/Pacemaker. This will be for Assessment for care tomorrow., 24. Please call if any questions. Thank you        Fax# is [phone]

## 2024-01-22 ENCOUNTER — TELEPHONE (OUTPATIENT)
Age: 79
End: 2024-01-22

## 2024-01-22 NOTE — TELEPHONE ENCOUNTER
Sanaz called in from [facility] to obtain recent office notes faxed to [phone]

## 2024-01-23 NOTE — TELEPHONE ENCOUNTER
Faxed last office note to   Sanaz at [facility]  scanned confirmation to media.

## 2024-01-24 ENCOUNTER — TELEPHONE (OUTPATIENT)
Age: 79
End: 2024-01-24

## 2024-01-24 NOTE — TELEPHONE ENCOUNTER
----- Message from Aparna Pro sent at 1/24/2024  3:43 PM EST -----  Subject: Appointment Request    Reason for Call: Established Patient Appointment needed: Routine Existing   Condition Follow Up    QUESTIONS    Reason for appointment request? Available appointments did not meet   patient need     Additional Information for Provider? Isabel from [facility]   is reaching out needing to schedule a follow up appointment for cad hld   hypothyroid for the patient before February 5th. Please reach out to   Isabel to discuss and schedule.   ---------------------------------------------------------------------------  --------------  CALL BACK INFO  [phone]; OK to leave message on voicemail  ---------------------------------------------------------------------------  --------------  SCRIPT ANSWERS

## 2024-04-12 RX ORDER — LEVOTHYROXINE SODIUM 0.07 MG/1
75 TABLET ORAL
Qty: 30 TABLET | Refills: 5 | Status: SHIPPED | OUTPATIENT
Start: 2024-04-12

## 2024-04-12 NOTE — TELEPHONE ENCOUNTER
Caller requests Refill of:  levothyroxine (SYNTHROID) 75 MCG tablet       Please send to:    [id number] - [place], [place] - [address] - P [phone] - F [phone]  [address] [address]  Phone: [phone] Fax: [phone]         Visit / Appointment History:  Future Appointment at [facility]:  6/13/2024   Last Appointment With PCP:  12/11/2023       Caller confirmed instructions and dosages as correct.    Caller was advised that Meds will be refilled as soon as possible, however there can be a 48-72 business hour turn around on refill requests.

## 2024-04-12 NOTE — TELEPHONE ENCOUNTER
PCP: Toni Anaya MD    Last appt:   12/11/2023    Future Appointments   Date Time Provider Department Center   6/13/2024 11:00 AM Toni Anaya MD [facility] AMB       Requested Prescriptions     Pending Prescriptions Disp Refills    levothyroxine (SYNTHROID) 75 MCG tablet 30 tablet      Sig: Take 1 tablet by mouth every morning (before breakfast)

## 2024-04-12 NOTE — TELEPHONE ENCOUNTER
Received duplicate for medication refill for Levothyroxine 75 MCG tablet refill approved today with 5 refills.

## 2024-04-24 ENCOUNTER — TELEPHONE (OUTPATIENT)
Age: 79
End: 2024-04-24

## 2024-04-24 NOTE — TELEPHONE ENCOUNTER
Pt is suffering with stomach flu.  Persistent diarrhea  and reoccurrence.    What does pt do?   Pt is asking that you call him as he is not doing well.

## 2024-04-24 NOTE — TELEPHONE ENCOUNTER
Spoke with patient. Easter weekend was around someone with stomach bug. After, started feeling bad and got worse during the week. Symptoms got better second week.     Sunday symptoms started again. No fever. Does not feel that bad per pt. Denies vomit but has regurgitation after eating and laying down.   Watery stools but staying hydrated with fluid and Gatorade. No appetite changes. Eating oatmeal, apple sauce, bland diet.     Tried imodium AD and metamucil    Believes symptoms are getting better but has been ongoing for the past 3 weeks    Please advise.

## 2024-04-24 NOTE — TELEPHONE ENCOUNTER
Spoke with patient. Advised. Verbalized understanding.     Per Dr. Anaya  Recommend probiotic such as align or florastor. If not better by next week would need appt here or UC

## 2024-05-02 RX ORDER — ATORVASTATIN CALCIUM 40 MG/1
40 TABLET, FILM COATED ORAL DAILY
Qty: 90 TABLET | Refills: 3 | Status: SHIPPED | OUTPATIENT
Start: 2024-05-02

## 2024-05-06 RX ORDER — EZETIMIBE 10 MG/1
10 TABLET ORAL DAILY
Qty: 30 TABLET | Refills: 5 | Status: SHIPPED | OUTPATIENT
Start: 2024-05-06

## 2024-06-10 ENCOUNTER — TELEPHONE (OUTPATIENT)
Age: 79
End: 2024-06-10

## 2024-06-11 NOTE — PROGRESS NOTES
HISTORY OF PRESENT ILLNESS   Jayce Delgadillo   is a 79 y.o.  male.  Fu CAD s/p 1 v cabg  2014  s/p MV repair ,CHF-ef normal now hx V-tach s/p AICD and amiodaraone, hx aflutter s/p CV, HLD BPH  hypothyroid hx skin cancer on face s/p mohsand medicare wellness--  EP Dr Fernandes  Card Dr Tracy-cad hx VT s/p AICD , low burden afib SSS-appt in August. No cp or sob new AICD placed Jan 2024  URO in [facility] for BPH-Dr Berry-last PSA down to 3  DERM-had prior mohs surgery    Still lives alone [place] and has condo in [place]    Niece is POA. Has older brother    Nonsmoker, bourbon in moderation    Feels well overall  Walks for exercise    Has cataract surery scheduled next week    Last OV  Had recent labs-ldl 62, normal cmp tsh cbc        Next week will get new AICD-Dr Fernandes  Vision getting worse left eye-went to VE  Some left leg edema and pain of left lower leg and foot this summer but has resolved per pt  Walking for exercise     Has not yet moeved to [facility]  Patient Active Problem List    Diagnosis Date Noted    S/P MVR (mitral valve replacement) 03/11/2021    Hx of ventricular tachycardia 03/11/2021    Coronary artery disease involving native coronary artery of native heart without angina pectoris 03/11/2021    Chronic systolic congestive heart failure (HCC) 03/11/2021    Benign prostatic hyperplasia without lower urinary tract symptoms 09/03/2020    Pure hypercholesterolemia 09/03/2020     Current Outpatient Medications   Medication Sig Dispense Refill    ezetimibe (ZETIA) 10 MG tablet TAKE 1 TABLET BY MOUTH DAILY 30 tablet 5    atorvastatin (LIPITOR) 40 MG tablet TAKE 1 TABLET BY MOUTH EVERY DAY 90 tablet 3    levothyroxine (SYNTHROID) 75 MCG tablet Take 1 tablet by mouth every morning (before breakfast) 30 tablet 5    ASPIRIN 81 PO Take 81 mg by mouth daily      amiodarone (CORDARONE) 200 MG tablet Take 1 tablet by mouth daily      ammonium lactate (LAC-HYDRIN) 12 % lotion Apply

## 2024-06-13 ENCOUNTER — OFFICE VISIT (OUTPATIENT)
Age: 79
End: 2024-06-13
Payer: MEDICARE

## 2024-06-13 VITALS
SYSTOLIC BLOOD PRESSURE: 110 MMHG | OXYGEN SATURATION: 95 % | RESPIRATION RATE: 16 BRPM | BODY MASS INDEX: 23.49 KG/M2 | DIASTOLIC BLOOD PRESSURE: 62 MMHG | TEMPERATURE: 97.1 F | WEIGHT: 155 LBS | HEART RATE: 62 BPM | HEIGHT: 68 IN

## 2024-06-13 DIAGNOSIS — I25.10 CORONARY ARTERY DISEASE INVOLVING NATIVE CORONARY ARTERY OF NATIVE HEART WITHOUT ANGINA PECTORIS: Primary | ICD-10-CM

## 2024-06-13 DIAGNOSIS — E78.5 HYPERLIPIDEMIA, UNSPECIFIED HYPERLIPIDEMIA TYPE: ICD-10-CM

## 2024-06-13 DIAGNOSIS — N40.0 BENIGN PROSTATIC HYPERPLASIA, UNSPECIFIED WHETHER LOWER URINARY TRACT SYMPTOMS PRESENT: ICD-10-CM

## 2024-06-13 DIAGNOSIS — E03.9 HYPOTHYROIDISM, UNSPECIFIED TYPE: ICD-10-CM

## 2024-06-13 DIAGNOSIS — I50.22 CHRONIC SYSTOLIC (CONGESTIVE) HEART FAILURE (HCC): ICD-10-CM

## 2024-06-13 DIAGNOSIS — Z86.79 HISTORY OF VENTRICULAR TACHYCARDIA: ICD-10-CM

## 2024-06-13 DIAGNOSIS — Z00.00 MEDICARE ANNUAL WELLNESS VISIT, SUBSEQUENT: ICD-10-CM

## 2024-06-13 PROCEDURE — G0439 PPPS, SUBSEQ VISIT: HCPCS | Performed by: INTERNAL MEDICINE

## 2024-06-13 PROCEDURE — G8420 CALC BMI NORM PARAMETERS: HCPCS | Performed by: INTERNAL MEDICINE

## 2024-06-13 PROCEDURE — 99213 OFFICE O/P EST LOW 20 MIN: CPT | Performed by: INTERNAL MEDICINE

## 2024-06-13 PROCEDURE — G8427 DOCREV CUR MEDS BY ELIG CLIN: HCPCS | Performed by: INTERNAL MEDICINE

## 2024-06-13 PROCEDURE — 1036F TOBACCO NON-USER: CPT | Performed by: INTERNAL MEDICINE

## 2024-06-13 PROCEDURE — 1123F ACP DISCUSS/DSCN MKR DOCD: CPT | Performed by: INTERNAL MEDICINE

## 2024-06-13 SDOH — ECONOMIC STABILITY: INCOME INSECURITY: HOW HARD IS IT FOR YOU TO PAY FOR THE VERY BASICS LIKE FOOD, HOUSING, MEDICAL CARE, AND HEATING?: NOT HARD AT ALL

## 2024-06-13 SDOH — ECONOMIC STABILITY: FOOD INSECURITY: WITHIN THE PAST 12 MONTHS, THE FOOD YOU BOUGHT JUST DIDN'T LAST AND YOU DIDN'T HAVE MONEY TO GET MORE.: NEVER TRUE

## 2024-06-13 SDOH — ECONOMIC STABILITY: FOOD INSECURITY: WITHIN THE PAST 12 MONTHS, YOU WORRIED THAT YOUR FOOD WOULD RUN OUT BEFORE YOU GOT MONEY TO BUY MORE.: NEVER TRUE

## 2024-06-13 ASSESSMENT — PATIENT HEALTH QUESTIONNAIRE - PHQ9
SUM OF ALL RESPONSES TO PHQ QUESTIONS 1-9: 2
1. LITTLE INTEREST OR PLEASURE IN DOING THINGS: SEVERAL DAYS
SUM OF ALL RESPONSES TO PHQ QUESTIONS 1-9: 2
2. FEELING DOWN, DEPRESSED OR HOPELESS: SEVERAL DAYS
SUM OF ALL RESPONSES TO PHQ QUESTIONS 1-9: 2
SUM OF ALL RESPONSES TO PHQ QUESTIONS 1-9: 2
SUM OF ALL RESPONSES TO PHQ9 QUESTIONS 1 & 2: 2

## 2024-06-13 ASSESSMENT — LIFESTYLE VARIABLES
HOW OFTEN DO YOU HAVE A DRINK CONTAINING ALCOHOL: 2-4 TIMES A MONTH
HOW MANY STANDARD DRINKS CONTAINING ALCOHOL DO YOU HAVE ON A TYPICAL DAY: 1 OR 2

## 2024-06-26 ENCOUNTER — APPOINTMENT (OUTPATIENT)
Facility: HOSPITAL | Age: 79
End: 2024-06-26
Payer: MEDICARE

## 2024-06-26 ENCOUNTER — HOSPITAL ENCOUNTER (EMERGENCY)
Facility: HOSPITAL | Age: 79
Discharge: HOME | End: 2024-06-26
Attending: EMERGENCY MEDICINE
Payer: MEDICARE

## 2024-06-26 VITALS
DIASTOLIC BLOOD PRESSURE: 75 MMHG | TEMPERATURE: 97.8 F | SYSTOLIC BLOOD PRESSURE: 130 MMHG | HEART RATE: 61 BPM | RESPIRATION RATE: 17 BRPM | OXYGEN SATURATION: 97 %

## 2024-06-26 DIAGNOSIS — R07.9 ACUTE CHEST PAIN: Primary | ICD-10-CM

## 2024-06-26 LAB
ALBUMIN SERPL-MCNC: 3.6 G/DL (ref 3.5–5)
ALBUMIN/GLOB SERPL: 1.1 (ref 1.1–2.2)
ALP SERPL-CCNC: 50 U/L (ref 45–117)
ALT SERPL-CCNC: 34 U/L (ref 12–78)
ANION GAP SERPL CALC-SCNC: 3 MMOL/L (ref 5–15)
AST SERPL-CCNC: 24 U/L (ref 15–37)
BASOPHILS # BLD: 0 K/UL (ref 0–0.1)
BASOPHILS NFR BLD: 1 % (ref 0–1)
BILIRUB SERPL-MCNC: 0.8 MG/DL (ref 0.2–1)
BUN SERPL-MCNC: 16 MG/DL (ref 6–20)
BUN/CREAT SERPL: 13 (ref 12–20)
CALCIUM SERPL-MCNC: 9 MG/DL (ref 8.5–10.1)
CHLORIDE SERPL-SCNC: 108 MMOL/L (ref 97–108)
CO2 SERPL-SCNC: 29 MMOL/L (ref 21–32)
CREAT SERPL-MCNC: 1.23 MG/DL (ref 0.7–1.3)
DIFFERENTIAL METHOD BLD: NORMAL
EKG ATRIAL RATE: 61 BPM
EKG DIAGNOSIS: NORMAL
EKG P AXIS: 69 DEGREES
EKG P-R INTERVAL: 270 MS
EKG Q-T INTERVAL: 458 MS
EKG QRS DURATION: 94 MS
EKG QTC CALCULATION (BAZETT): 461 MS
EKG R AXIS: 28 DEGREES
EKG T AXIS: 66 DEGREES
EKG VENTRICULAR RATE: 61 BPM
EOSINOPHIL # BLD: 0.1 K/UL (ref 0–0.4)
EOSINOPHIL NFR BLD: 3 % (ref 0–7)
ERYTHROCYTE [DISTWIDTH] IN BLOOD BY AUTOMATED COUNT: 12.7 % (ref 11.5–14.5)
GLOBULIN SER CALC-MCNC: 3.2 G/DL (ref 2–4)
GLUCOSE SERPL-MCNC: 103 MG/DL (ref 65–100)
HCT VFR BLD AUTO: 42.5 % (ref 36.6–50.3)
HGB BLD-MCNC: 13.6 G/DL (ref 12.1–17)
IMM GRANULOCYTES # BLD AUTO: 0 K/UL (ref 0–0.04)
IMM GRANULOCYTES NFR BLD AUTO: 0 % (ref 0–0.5)
LYMPHOCYTES # BLD: 1 K/UL (ref 0.8–3.5)
LYMPHOCYTES NFR BLD: 18 % (ref 12–49)
MCH RBC QN AUTO: 29.9 PG (ref 26–34)
MCHC RBC AUTO-ENTMCNC: 32 G/DL (ref 30–36.5)
MCV RBC AUTO: 93.4 FL (ref 80–99)
MONOCYTES # BLD: 0.6 K/UL (ref 0–1)
MONOCYTES NFR BLD: 11 % (ref 5–13)
NEUTS SEG # BLD: 3.5 K/UL (ref 1.8–8)
NEUTS SEG NFR BLD: 67 % (ref 32–75)
NRBC # BLD: 0 K/UL (ref 0–0.01)
NRBC BLD-RTO: 0 PER 100 WBC
PLATELET # BLD AUTO: 162 K/UL (ref 150–400)
PMV BLD AUTO: 11 FL (ref 8.9–12.9)
POTASSIUM SERPL-SCNC: 3.9 MMOL/L (ref 3.5–5.1)
PROT SERPL-MCNC: 6.8 G/DL (ref 6.4–8.2)
RBC # BLD AUTO: 4.55 M/UL (ref 4.1–5.7)
SODIUM SERPL-SCNC: 140 MMOL/L (ref 136–145)
TROPONIN I SERPL HS-MCNC: 6 NG/L (ref 0–76)
TROPONIN I SERPL HS-MCNC: 7 NG/L (ref 0–76)
WBC # BLD AUTO: 5.2 K/UL (ref 4.1–11.1)

## 2024-06-26 PROCEDURE — 99285 EMERGENCY DEPT VISIT HI MDM: CPT

## 2024-06-26 PROCEDURE — 71045 X-RAY EXAM CHEST 1 VIEW: CPT

## 2024-06-26 PROCEDURE — 85025 COMPLETE CBC W/AUTO DIFF WBC: CPT

## 2024-06-26 PROCEDURE — 80053 COMPREHEN METABOLIC PANEL: CPT

## 2024-06-26 PROCEDURE — 84484 ASSAY OF TROPONIN QUANT: CPT

## 2024-06-26 PROCEDURE — 93005 ELECTROCARDIOGRAM TRACING: CPT | Performed by: EMERGENCY MEDICINE

## 2024-06-26 PROCEDURE — 36415 COLL VENOUS BLD VENIPUNCTURE: CPT

## 2024-06-26 ASSESSMENT — HEART SCORE: ECG: NORMAL

## 2024-06-26 NOTE — ED PROVIDER NOTES
tablet  Commonly known as: LIPITOR  TAKE 1 TABLET BY MOUTH EVERY DAY     carvedilol 3.125 MG tablet  Commonly known as: COREG     clopidogrel 75 MG tablet  Commonly known as: PLAVIX     ezetimibe 10 MG tablet  Commonly known as: ZETIA  TAKE 1 TABLET BY MOUTH DAILY     finasteride 5 MG tablet  Commonly known as: PROSCAR     levothyroxine 75 MCG tablet  Commonly known as: SYNTHROID  Take 1 tablet by mouth every morning (before breakfast)                DISCONTINUED MEDICATIONS:  Current Discharge Medication List          I am the Primary Clinician of Record.   Karolina Guerin MD (electronically signed)    (Please note that parts of this dictation were completed with voice recognition software. Quite often unanticipated grammatical, syntax, homophones, and other interpretive errors are inadvertently transcribed by the computer software. Please disregards these errors. Please excuse any errors that have escaped final proofreading.)        Karolina Guerin MD  06/27/24 [id number]

## 2024-07-10 ENCOUNTER — TELEPHONE (OUTPATIENT)
Age: 79
End: 2024-07-10

## 2024-07-10 NOTE — TELEPHONE ENCOUNTER
Patient called in requesting Dr. Anaya to be aware that he was recently in the hospital. No hosp f/up appt required per patient, he is following up with cardio

## 2024-10-17 RX ORDER — LEVOTHYROXINE SODIUM 75 UG/1
75 TABLET ORAL
Qty: 90 TABLET | Refills: 1 | Status: SHIPPED | OUTPATIENT
Start: 2024-10-17

## 2024-10-17 NOTE — TELEPHONE ENCOUNTER
Received faxed refill request from pharmacy      PCP: Toni Anaya MD    Last appt: 6/13/2024  Future Appointments   Date Time Provider Department Center   1/2/2025 11:00 AM Toni Anaya MD [facility] DEP       Requested Prescriptions     Pending Prescriptions Disp Refills    levothyroxine (SYNTHROID) 75 MCG tablet 90 tablet 1     Sig: Take 1 tablet by mouth every morning (before breakfast)

## 2024-11-21 RX ORDER — EZETIMIBE 10 MG/1
10 TABLET ORAL DAILY
Qty: 90 TABLET | Refills: 1 | Status: SHIPPED | OUTPATIENT
Start: 2024-11-21

## 2024-11-21 NOTE — TELEPHONE ENCOUNTER
Received faxed refill request from pharmacy      PCP: Toni Anaya MD    Last appt: 6/13/2024  Future Appointments   Date Time Provider Department Center   1/2/2025 11:00 AM Toni Anaya MD [facility] DEP       Requested Prescriptions     Pending Prescriptions Disp Refills    ezetimibe (ZETIA) 10 MG tablet 90 tablet 1     Sig: Take 1 tablet by mouth daily

## 2024-12-30 ENCOUNTER — TELEPHONE (OUTPATIENT)
Age: 79
End: 2024-12-30

## 2024-12-30 DIAGNOSIS — E03.9 HYPOTHYROIDISM, UNSPECIFIED TYPE: ICD-10-CM

## 2024-12-30 DIAGNOSIS — I50.9 CONGESTIVE HEART FAILURE, UNSPECIFIED HF CHRONICITY, UNSPECIFIED HEART FAILURE TYPE (HCC): ICD-10-CM

## 2024-12-30 DIAGNOSIS — E78.5 HYPERLIPIDEMIA, UNSPECIFIED HYPERLIPIDEMIA TYPE: ICD-10-CM

## 2024-12-30 DIAGNOSIS — I25.10 CORONARY ARTERY DISEASE INVOLVING NATIVE CORONARY ARTERY OF NATIVE HEART WITHOUT ANGINA PECTORIS: Primary | ICD-10-CM

## 2024-12-30 NOTE — TELEPHONE ENCOUNTER
Pt is asking that lab orders be put in system for him to get done prior to upcoming appt.    Send to [facility] in [facility]   Fax #[phone]      Pt would like these as soon as possible. Thanks.     Please call pt when this has happened so he knows when to go.

## 2024-12-31 LAB
ERYTHROCYTE [DISTWIDTH] IN BLOOD BY AUTOMATED COUNT: 12.1 % (ref 11.6–15.4)
HCT VFR BLD AUTO: 40.4 % (ref 37.5–51)
HGB BLD-MCNC: 13 G/DL (ref 13–17.7)
MCH RBC QN AUTO: 30.2 PG (ref 26.6–33)
MCHC RBC AUTO-ENTMCNC: 32.2 G/DL (ref 31.5–35.7)
MCV RBC AUTO: 94 FL (ref 79–97)
PLATELET # BLD AUTO: 161 X10E3/UL (ref 150–450)
RBC # BLD AUTO: 4.31 X10E6/UL (ref 4.14–5.8)
WBC # BLD AUTO: 4.6 X10E3/UL (ref 3.4–10.8)

## 2025-01-01 LAB
ALBUMIN SERPL-MCNC: 3.9 G/DL (ref 3.8–4.8)
ALP SERPL-CCNC: 51 IU/L (ref 44–121)
ALT SERPL-CCNC: 24 IU/L (ref 0–44)
AST SERPL-CCNC: 28 IU/L (ref 0–40)
BILIRUB SERPL-MCNC: 0.8 MG/DL (ref 0–1.2)
BUN SERPL-MCNC: 14 MG/DL (ref 8–27)
BUN/CREAT SERPL: 13 (ref 10–24)
CALCIUM SERPL-MCNC: 9 MG/DL (ref 8.6–10.2)
CHLORIDE SERPL-SCNC: 104 MMOL/L (ref 96–106)
CHOLEST SERPL-MCNC: 152 MG/DL (ref 100–199)
CO2 SERPL-SCNC: 24 MMOL/L (ref 20–29)
CREAT SERPL-MCNC: 1.12 MG/DL (ref 0.76–1.27)
EGFRCR SERPLBLD CKD-EPI 2021: 67 ML/MIN/1.73
GLOBULIN SER CALC-MCNC: 2.6 G/DL (ref 1.5–4.5)
GLUCOSE SERPL-MCNC: 90 MG/DL (ref 70–99)
HDLC SERPL-MCNC: 74 MG/DL
LDLC SERPL CALC-MCNC: 66 MG/DL (ref 0–99)
POTASSIUM SERPL-SCNC: 4.3 MMOL/L (ref 3.5–5.2)
PROT SERPL-MCNC: 6.5 G/DL (ref 6–8.5)
SODIUM SERPL-SCNC: 140 MMOL/L (ref 134–144)
TRIGL SERPL-MCNC: 58 MG/DL (ref 0–149)
TSH SERPL DL<=0.005 MIU/L-ACNC: 3.12 UIU/ML (ref 0.45–4.5)
VLDLC SERPL CALC-MCNC: 12 MG/DL (ref 5–40)

## 2025-01-02 ENCOUNTER — OFFICE VISIT (OUTPATIENT)
Age: 80
End: 2025-01-02
Payer: MEDICARE

## 2025-01-02 VITALS
BODY MASS INDEX: 24.31 KG/M2 | SYSTOLIC BLOOD PRESSURE: 130 MMHG | WEIGHT: 160.4 LBS | OXYGEN SATURATION: 98 % | DIASTOLIC BLOOD PRESSURE: 62 MMHG | TEMPERATURE: 97.4 F | HEIGHT: 68 IN | HEART RATE: 83 BPM | RESPIRATION RATE: 16 BRPM

## 2025-01-02 DIAGNOSIS — Z85.828 HISTORY OF SKIN CANCER: ICD-10-CM

## 2025-01-02 DIAGNOSIS — I50.22 CHRONIC SYSTOLIC (CONGESTIVE) HEART FAILURE (HCC): ICD-10-CM

## 2025-01-02 DIAGNOSIS — Z86.79 HX OF VENTRICULAR TACHYCARDIA: ICD-10-CM

## 2025-01-02 DIAGNOSIS — E03.9 HYPOTHYROIDISM, UNSPECIFIED TYPE: ICD-10-CM

## 2025-01-02 DIAGNOSIS — E78.5 HYPERLIPIDEMIA, UNSPECIFIED HYPERLIPIDEMIA TYPE: ICD-10-CM

## 2025-01-02 DIAGNOSIS — I25.10 CORONARY ARTERY DISEASE INVOLVING NATIVE CORONARY ARTERY OF NATIVE HEART WITHOUT ANGINA PECTORIS: ICD-10-CM

## 2025-01-02 DIAGNOSIS — R09.81 SINUS CONGESTION: Primary | ICD-10-CM

## 2025-01-02 PROCEDURE — 99214 OFFICE O/P EST MOD 30 MIN: CPT | Performed by: INTERNAL MEDICINE

## 2025-01-02 ASSESSMENT — PATIENT HEALTH QUESTIONNAIRE - PHQ9
SUM OF ALL RESPONSES TO PHQ QUESTIONS 1-9: 0
2. FEELING DOWN, DEPRESSED OR HOPELESS: NOT AT ALL
SUM OF ALL RESPONSES TO PHQ QUESTIONS 1-9: 0
1. LITTLE INTEREST OR PLEASURE IN DOING THINGS: NOT AT ALL
SUM OF ALL RESPONSES TO PHQ9 QUESTIONS 1 & 2: 0

## 2025-01-02 NOTE — PROGRESS NOTES
\"Have you been to the ER, urgent care clinic since your last visit?  Hospitalized since your last visit?\"    ER 6/2024 chest pain   ER 9/13 fall     “Have you seen or consulted any other health care providers outside our system since your last visit?”    Cardiologist // [facility]

## 2025-04-07 RX ORDER — LEVOTHYROXINE SODIUM 75 UG/1
75 TABLET ORAL
Qty: 90 TABLET | Refills: 1 | Status: SHIPPED | OUTPATIENT
Start: 2025-04-07

## 2025-04-07 NOTE — TELEPHONE ENCOUNTER
Received faxed refill request from pharmacy      PCP: Toni Anaya MD    Last appt: 1/2/2025  Future Appointments   Date Time Provider Department Center   7/8/2025 11:00 AM Toni Anaya MD [facility] DEP       Requested Prescriptions     Pending Prescriptions Disp Refills    levothyroxine (SYNTHROID) 75 MCG tablet 90 tablet 1     Sig: Take 1 tablet by mouth every morning (before breakfast)

## 2025-05-21 DIAGNOSIS — E78.5 HYPERLIPIDEMIA, UNSPECIFIED HYPERLIPIDEMIA TYPE: Primary | ICD-10-CM

## 2025-05-21 RX ORDER — ATORVASTATIN CALCIUM 40 MG/1
40 TABLET, FILM COATED ORAL DAILY
Qty: 90 TABLET | Refills: 3 | Status: SHIPPED | OUTPATIENT
Start: 2025-05-21

## 2025-05-21 NOTE — TELEPHONE ENCOUNTER
Received faxed refill request from pharmacy      PCP: Toni Anaya MD    Last appt: 1/2/2025  Future Appointments   Date Time Provider Department Center   7/8/2025 11:00 AM Toni Anaya MD [facility] DEP       Requested Prescriptions     Pending Prescriptions Disp Refills    atorvastatin (LIPITOR) 40 MG tablet 90 tablet 1     Sig: Take 1 tablet by mouth daily

## 2025-05-27 DIAGNOSIS — E78.5 HYPERLIPIDEMIA, UNSPECIFIED HYPERLIPIDEMIA TYPE: Primary | ICD-10-CM

## 2025-05-27 RX ORDER — EZETIMIBE 10 MG/1
10 TABLET ORAL DAILY
Qty: 90 TABLET | Refills: 3 | Status: SHIPPED | OUTPATIENT
Start: 2025-05-27

## 2025-05-27 NOTE — TELEPHONE ENCOUNTER
Received faxed refill request from pharmacy      PCP: Toni Anaya MD    Last appt: 1/2/2025  Future Appointments   Date Time Provider Department Center   7/8/2025 11:00 AM Toni Anaya MD [facility] DEP       Requested Prescriptions     Pending Prescriptions Disp Refills    ezetimibe (ZETIA) 10 MG tablet 90 tablet 0     Sig: Take 1 tablet by mouth daily

## 2025-06-25 LAB
BASOPHILS # BLD AUTO: 0 X10E3/UL (ref 0–0.2)
BASOPHILS NFR BLD AUTO: 1 %
EOSINOPHIL # BLD AUTO: 0.2 X10E3/UL (ref 0–0.4)
EOSINOPHIL NFR BLD AUTO: 5 %
ERYTHROCYTE [DISTWIDTH] IN BLOOD BY AUTOMATED COUNT: 12.9 % (ref 11.6–15.4)
HCT VFR BLD AUTO: 41.8 % (ref 37.5–51)
HGB BLD-MCNC: 13.4 G/DL (ref 13–17.7)
IMM GRANULOCYTES # BLD AUTO: 0 X10E3/UL (ref 0–0.1)
IMM GRANULOCYTES NFR BLD AUTO: 0 %
LYMPHOCYTES # BLD AUTO: 1.1 X10E3/UL (ref 0.7–3.1)
LYMPHOCYTES NFR BLD AUTO: 20 %
MCH RBC QN AUTO: 30.5 PG (ref 26.6–33)
MCHC RBC AUTO-ENTMCNC: 32.1 G/DL (ref 31.5–35.7)
MCV RBC AUTO: 95 FL (ref 79–97)
MONOCYTES # BLD AUTO: 0.6 X10E3/UL (ref 0.1–0.9)
MONOCYTES NFR BLD AUTO: 12 %
NEUTROPHILS # BLD AUTO: 3.4 X10E3/UL (ref 1.4–7)
NEUTROPHILS NFR BLD AUTO: 62 %
PLATELET # BLD AUTO: 181 X10E3/UL (ref 150–450)
RBC # BLD AUTO: 4.4 X10E6/UL (ref 4.14–5.8)
SPECIMEN STATUS REPORT: NORMAL
WBC # BLD AUTO: 5.4 X10E3/UL (ref 3.4–10.8)

## 2025-06-26 LAB
ALBUMIN SERPL-MCNC: 4.2 G/DL (ref 3.8–4.8)
ALP SERPL-CCNC: 55 IU/L (ref 44–121)
ALT SERPL-CCNC: 28 IU/L (ref 0–44)
AST SERPL-CCNC: 34 IU/L (ref 0–40)
BILIRUB SERPL-MCNC: 0.7 MG/DL (ref 0–1.2)
BUN SERPL-MCNC: 15 MG/DL (ref 8–27)
BUN/CREAT SERPL: 13 (ref 10–24)
CALCIUM SERPL-MCNC: 9.2 MG/DL (ref 8.6–10.2)
CHLORIDE SERPL-SCNC: 102 MMOL/L (ref 96–106)
CHOLEST SERPL-MCNC: 157 MG/DL (ref 100–199)
CO2 SERPL-SCNC: 22 MMOL/L (ref 20–29)
CREAT SERPL-MCNC: 1.2 MG/DL (ref 0.76–1.27)
EGFRCR SERPLBLD CKD-EPI 2021: 61 ML/MIN/1.73
GLOBULIN SER CALC-MCNC: 2.5 G/DL (ref 1.5–4.5)
GLUCOSE SERPL-MCNC: 93 MG/DL (ref 70–99)
HDLC SERPL-MCNC: 71 MG/DL
LDLC SERPL CALC-MCNC: 73 MG/DL (ref 0–99)
POTASSIUM SERPL-SCNC: 4.4 MMOL/L (ref 3.5–5.2)
PROT SERPL-MCNC: 6.7 G/DL (ref 6–8.5)
SODIUM SERPL-SCNC: 142 MMOL/L (ref 134–144)
TRIGL SERPL-MCNC: 67 MG/DL (ref 0–149)
TSH SERPL DL<=0.005 MIU/L-ACNC: 6.29 UIU/ML (ref 0.45–4.5)
VLDLC SERPL CALC-MCNC: 13 MG/DL (ref 5–40)

## 2025-07-08 ENCOUNTER — OFFICE VISIT (OUTPATIENT)
Age: 80
End: 2025-07-08
Payer: MEDICARE

## 2025-07-08 VITALS
RESPIRATION RATE: 16 BRPM | TEMPERATURE: 97.2 F | WEIGHT: 157 LBS | SYSTOLIC BLOOD PRESSURE: 124 MMHG | BODY MASS INDEX: 23.79 KG/M2 | OXYGEN SATURATION: 98 % | HEART RATE: 70 BPM | DIASTOLIC BLOOD PRESSURE: 78 MMHG | HEIGHT: 68 IN

## 2025-07-08 DIAGNOSIS — I48.0 PAF (PAROXYSMAL ATRIAL FIBRILLATION) (HCC): ICD-10-CM

## 2025-07-08 DIAGNOSIS — Z00.00 MEDICARE ANNUAL WELLNESS VISIT, SUBSEQUENT: ICD-10-CM

## 2025-07-08 DIAGNOSIS — E03.9 HYPOTHYROIDISM, UNSPECIFIED TYPE: ICD-10-CM

## 2025-07-08 DIAGNOSIS — N40.0 BENIGN PROSTATIC HYPERPLASIA WITHOUT LOWER URINARY TRACT SYMPTOMS: ICD-10-CM

## 2025-07-08 DIAGNOSIS — I25.10 CORONARY ARTERY DISEASE INVOLVING NATIVE CORONARY ARTERY OF NATIVE HEART WITHOUT ANGINA PECTORIS: Primary | ICD-10-CM

## 2025-07-08 DIAGNOSIS — Z86.79 HX OF VENTRICULAR TACHYCARDIA: ICD-10-CM

## 2025-07-08 PROCEDURE — 99214 OFFICE O/P EST MOD 30 MIN: CPT | Performed by: INTERNAL MEDICINE

## 2025-07-08 PROCEDURE — 1036F TOBACCO NON-USER: CPT | Performed by: INTERNAL MEDICINE

## 2025-07-08 PROCEDURE — G8427 DOCREV CUR MEDS BY ELIG CLIN: HCPCS | Performed by: INTERNAL MEDICINE

## 2025-07-08 PROCEDURE — G0439 PPPS, SUBSEQ VISIT: HCPCS | Performed by: INTERNAL MEDICINE

## 2025-07-08 PROCEDURE — 1123F ACP DISCUSS/DSCN MKR DOCD: CPT | Performed by: INTERNAL MEDICINE

## 2025-07-08 PROCEDURE — 1159F MED LIST DOCD IN RCRD: CPT | Performed by: INTERNAL MEDICINE

## 2025-07-08 PROCEDURE — G8420 CALC BMI NORM PARAMETERS: HCPCS | Performed by: INTERNAL MEDICINE

## 2025-07-08 SDOH — ECONOMIC STABILITY: FOOD INSECURITY: WITHIN THE PAST 12 MONTHS, THE FOOD YOU BOUGHT JUST DIDN'T LAST AND YOU DIDN'T HAVE MONEY TO GET MORE.: NEVER TRUE

## 2025-07-08 SDOH — ECONOMIC STABILITY: FOOD INSECURITY: WITHIN THE PAST 12 MONTHS, YOU WORRIED THAT YOUR FOOD WOULD RUN OUT BEFORE YOU GOT MONEY TO BUY MORE.: NEVER TRUE

## 2025-07-08 ASSESSMENT — PATIENT HEALTH QUESTIONNAIRE - PHQ9
SUM OF ALL RESPONSES TO PHQ QUESTIONS 1-9: 0
SUM OF ALL RESPONSES TO PHQ QUESTIONS 1-9: 0
2. FEELING DOWN, DEPRESSED OR HOPELESS: NOT AT ALL
1. LITTLE INTEREST OR PLEASURE IN DOING THINGS: NOT AT ALL
SUM OF ALL RESPONSES TO PHQ QUESTIONS 1-9: 0
SUM OF ALL RESPONSES TO PHQ QUESTIONS 1-9: 0

## 2025-07-08 ASSESSMENT — LIFESTYLE VARIABLES
HOW OFTEN DO YOU HAVE A DRINK CONTAINING ALCOHOL: MONTHLY OR LESS
HOW MANY STANDARD DRINKS CONTAINING ALCOHOL DO YOU HAVE ON A TYPICAL DAY: 1 OR 2

## 2025-07-08 NOTE — PROGRESS NOTES
Have you been to the ER, urgent care clinic since your last visit?  Hospitalized since your last visit?   no    Have you seen or consulted any other health care providers outside our system since your last visit?   no           
tablet by mouth daily      finasteride (PROSCAR) 5 MG tablet Take 1 tablet by mouth daily       No current facility-administered medications for this visit.     Allergies   Allergen Reactions    Peanut Oil Anaphylaxis    Penicillin G Swelling    Bacitracin Rash and Swelling     Eyes swelled up to almost closed.      Social History     Tobacco Use    Smoking status: Never    Smokeless tobacco: Never   Substance Use Topics    Alcohol use: Yes        BMP:   Lab Results   Component Value Date/Time     06/25/2025 09:45 AM    K 4.4 06/25/2025 09:45 AM     06/25/2025 09:45 AM    CO2 22 06/25/2025 09:45 AM    BUN 15 06/25/2025 09:45 AM    CREATININE 1.20 06/25/2025 09:45 AM    GLUCOSE 93 06/25/2025 09:45 AM    CALCIUM 9.2 06/25/2025 09:45 AM      CBC:   Lab Results   Component Value Date/Time    WBC 5.4 06/25/2025 09:45 AM    RBC 4.40 06/25/2025 09:45 AM    HGB 13.4 06/25/2025 09:45 AM    HCT 41.8 06/25/2025 09:45 AM    MCV 95 06/25/2025 09:45 AM    MCH 30.5 06/25/2025 09:45 AM    MCHC 32.1 06/25/2025 09:45 AM    RDW 12.9 06/25/2025 09:45 AM     06/25/2025 09:45 AM    MPV 11.0 06/26/2024 11:45 AM      Lipids   Lab Results   Component Value Date/Time    CHOL 157 06/25/2025 09:45 AM    TRIG 67 06/25/2025 09:45 AM    HDL 71 06/25/2025 09:45 AM     Hemoglobin A1C: No results found for: \"LABA1C\", \"[id number]\"     Review of Systems     Physical Exam  Constitutional:       Appearance: Normal appearance.   HENT:      Head: Normocephalic.      Right Ear: Tympanic membrane normal.      Left Ear: Tympanic membrane normal.      Nose: Nose normal.   Cardiovascular:      Rate and Rhythm: Normal rate and regular rhythm.      Pulses: Normal pulses.   Pulmonary:      Effort: Pulmonary effort is normal.   Abdominal:      General: Abdomen is flat. Bowel sounds are normal.   Musculoskeletal:         General: Normal range of motion.      Cervical back: Normal range of motion.      Right lower leg: No edema.   Skin:

## (undated) DEVICE — SYR POWER 150ML 8IN FILL TUBE --

## (undated) DEVICE — GLIDESHEATH SLENDER ACCESS KIT: Brand: GLIDESHEATH SLENDER

## (undated) DEVICE — TUBING PRSS MON L6IN PVC M FEM CONN

## (undated) DEVICE — RADIFOCUS OPTITORQUE ANGIOGRAPHIC CATHETER: Brand: OPTITORQUE

## (undated) DEVICE — PACK PROCEDURE SURG HRT CATH

## (undated) DEVICE — RUNTHROUGH NS EXTRA FLOPPY PTCA GUIDEWIRE: Brand: RUNTHROUGH

## (undated) DEVICE — SPLINT WR POS F/ARTERIAL ACC -- BX/10

## (undated) DEVICE — TR BAND RADIAL ARTERY COMPRESSION DEVICE: Brand: TR BAND

## (undated) DEVICE — PRESSURE GUIDEWIRE: Brand: COMET™ II

## (undated) DEVICE — CUSTOM KT PTCA INFL DEV K05 00053H

## (undated) DEVICE — KIT ANGIOGRAPHY CUST MRMC

## (undated) DEVICE — 3M™ TEGADERM™ TRANSPARENT FILM DRESSING FRAME STYLE, 1626W, 4 IN X 4-3/4 IN (10 CM X 12 CM), 50/CT 4CT/CASE: Brand: 3M™ TEGADERM™

## (undated) DEVICE — SYR MED 10ML FIX M LT BLU -- MEDALLION

## (undated) DEVICE — VALVE INSRT TOOL ADPT MTL 9FR -- ACCESS

## (undated) DEVICE — ROSEN CURVED WIRE GUIDE: Brand: ROSEN

## (undated) DEVICE — TREK CORONARY DILATATION CATHETER 2.50 MM X 12 MM / RAPID-EXCHANGE: Brand: TREK

## (undated) DEVICE — PROVE COVER: Brand: UNBRANDED

## (undated) DEVICE — SYRINGE ANGIO 10 CC BRL STD PRNT POLYCARB LT BLU MEDALLION

## (undated) DEVICE — CATH GUID COR EB35 6FR 100CM -- LAUNCHER